# Patient Record
Sex: MALE | Race: WHITE | NOT HISPANIC OR LATINO | URBAN - METROPOLITAN AREA
[De-identification: names, ages, dates, MRNs, and addresses within clinical notes are randomized per-mention and may not be internally consistent; named-entity substitution may affect disease eponyms.]

---

## 2023-10-06 PROBLEM — N13.8 BENIGN PROSTATIC HYPERPLASIA WITH URINARY OBSTRUCTION: Status: ACTIVE | Noted: 2018-05-09

## 2023-10-06 PROBLEM — R97.20 RAISED PROSTATE SPECIFIC ANTIGEN: Status: ACTIVE | Noted: 2018-07-05

## 2023-10-06 PROBLEM — N62 GYNECOMASTIA: Status: ACTIVE | Noted: 2020-12-11

## 2023-10-06 PROBLEM — C61 MALIGNANT TUMOR OF PROSTATE (MULTI): Status: ACTIVE | Noted: 2018-07-26

## 2023-10-06 PROBLEM — N40.1 BENIGN PROSTATIC HYPERPLASIA WITH URINARY OBSTRUCTION: Status: ACTIVE | Noted: 2018-05-09

## 2023-10-06 PROBLEM — R35.0 INCREASED FREQUENCY OF URINATION: Status: ACTIVE | Noted: 2022-03-09

## 2023-10-06 PROBLEM — R31.0 FRANK HEMATURIA: Status: ACTIVE | Noted: 2018-07-05

## 2023-10-06 RX ORDER — ZOLEDRONIC ACID 4 MG/5ML
INJECTION INTRAVENOUS
COMMUNITY
Start: 2021-03-19

## 2023-10-06 RX ORDER — NITROGLYCERIN 0.4 MG/1
0.4 TABLET SUBLINGUAL EVERY 5 MIN PRN
COMMUNITY

## 2023-10-06 RX ORDER — SOTALOL HYDROCHLORIDE 120 MG/1
120 TABLET ORAL EVERY 12 HOURS
COMMUNITY
Start: 2013-02-21

## 2023-10-06 RX ORDER — TAMSULOSIN HYDROCHLORIDE 0.4 MG/1
0.4 CAPSULE ORAL 2 TIMES DAILY
COMMUNITY

## 2023-10-06 RX ORDER — FUROSEMIDE 20 MG/1
20 TABLET ORAL DAILY PRN
COMMUNITY
Start: 2015-01-08 | End: 2023-10-24 | Stop reason: SDUPTHER

## 2023-10-06 RX ORDER — SIMVASTATIN 10 MG/1
10 TABLET, FILM COATED ORAL DAILY
COMMUNITY
Start: 2011-01-04 | End: 2024-04-02

## 2023-10-06 RX ORDER — DORZOLAMIDE HYDROCHLORIDE AND TIMOLOL MALEATE 20; 5 MG/ML; MG/ML
SOLUTION/ DROPS OPHTHALMIC
COMMUNITY
Start: 2017-06-13

## 2023-10-06 RX ORDER — POTASSIUM CHLORIDE 750 MG/1
10 CAPSULE, EXTENDED RELEASE ORAL DAILY
COMMUNITY
Start: 2020-02-27

## 2023-10-06 RX ORDER — BIMATOPROST 0.1 MG/ML
1 SOLUTION/ DROPS OPHTHALMIC NIGHTLY
COMMUNITY

## 2023-10-06 RX ORDER — MELATONIN 5 MG
1 CAPSULE ORAL NIGHTLY
COMMUNITY

## 2023-10-06 RX ORDER — ZOLEDRONIC ACID 5 MG/100ML
5 INJECTION, SOLUTION INTRAVENOUS ONCE
COMMUNITY

## 2023-10-06 RX ORDER — NAPROXEN SODIUM 220 MG/1
81 TABLET, FILM COATED ORAL DAILY
COMMUNITY
Start: 2012-11-01

## 2023-10-06 RX ORDER — BRIMONIDINE TARTRATE 1 MG/ML
1 SOLUTION/ DROPS OPHTHALMIC
COMMUNITY

## 2023-10-06 RX ORDER — WARFARIN 3 MG/1
3 TABLET ORAL DAILY
COMMUNITY
Start: 2022-03-17 | End: 2023-12-21 | Stop reason: SDUPTHER

## 2023-10-06 RX ORDER — RISEDRONATE SODIUM 150 MG/1
150 TABLET, FILM COATED ORAL
COMMUNITY

## 2023-10-06 RX ORDER — ACETAMINOPHEN 500 MG
TABLET ORAL
COMMUNITY
Start: 2022-05-26

## 2023-10-12 ENCOUNTER — OFFICE VISIT (OUTPATIENT)
Dept: PRIMARY CARE | Facility: CLINIC | Age: 83
End: 2023-10-12
Payer: MEDICARE

## 2023-10-12 DIAGNOSIS — I48.11 LONGSTANDING PERSISTENT ATRIAL FIBRILLATION (MULTI): Primary | ICD-10-CM

## 2023-10-12 LAB — POC INR: 2.4 (ref 0.9–1.1)

## 2023-10-12 PROCEDURE — 1036F TOBACCO NON-USER: CPT | Performed by: NURSE PRACTITIONER

## 2023-10-12 PROCEDURE — 85610 PROTHROMBIN TIME: CPT | Performed by: NURSE PRACTITIONER

## 2023-10-12 PROCEDURE — 99213 OFFICE O/P EST LOW 20 MIN: CPT | Performed by: NURSE PRACTITIONER

## 2023-10-12 ASSESSMENT — ENCOUNTER SYMPTOMS
CONSTITUTIONAL NEGATIVE: 1
CARDIOVASCULAR NEGATIVE: 1
RESPIRATORY NEGATIVE: 1

## 2023-10-12 NOTE — PROGRESS NOTES
Subjective   Patient ID: Gerry Celeste is a 82 y.o. male.    HPI  Here for inr check   Feels well but just lost a son from a devasting PE  He is sleeping and eating no other issues  No cp, sob, pressure, pain or swelling and no bleeding  Warfarin 3mg all days  Last inr 2.4 todays inr 2.4 no changes follow up 3 weeks       Review of Systems   Constitutional: Negative.    Respiratory: Negative.     Cardiovascular: Negative.    Genitourinary: Negative.    Skin: Negative.    All other systems reviewed and are negative.      Objective   Physical Exam  Constitutional:       Appearance: Normal appearance. He is normal weight.   Eyes:      Pupils: Pupils are equal, round, and reactive to light.   Cardiovascular:      Rate and Rhythm: Normal rate and regular rhythm.      Heart sounds: Normal heart sounds.   Pulmonary:      Effort: Pulmonary effort is normal.      Breath sounds: Normal breath sounds.   Skin:     General: Skin is warm and dry.   Neurological:      General: No focal deficit present.      Mental Status: He is alert and oriented to person, place, and time. Mental status is at baseline.   Psychiatric:         Behavior: Behavior normal.         Thought Content: Thought content normal.         Judgment: Judgment normal.         Assessment/Plan   Diagnoses and all orders for this visit:  Longstanding persistent atrial fibrillation (CMS/HCC)  -     POCT INR manually resulted  Other orders  -     Follow Up In Primary Care - Established; Future

## 2023-10-24 ENCOUNTER — OFFICE VISIT (OUTPATIENT)
Dept: PRIMARY CARE | Facility: CLINIC | Age: 83
End: 2023-10-24
Payer: MEDICARE

## 2023-10-24 VITALS
HEIGHT: 72 IN | OXYGEN SATURATION: 98 % | WEIGHT: 151 LBS | SYSTOLIC BLOOD PRESSURE: 142 MMHG | BODY MASS INDEX: 20.45 KG/M2 | TEMPERATURE: 97.9 F | RESPIRATION RATE: 16 BRPM | DIASTOLIC BLOOD PRESSURE: 80 MMHG | HEART RATE: 72 BPM

## 2023-10-24 DIAGNOSIS — R60.0 LOCALIZED EDEMA: ICD-10-CM

## 2023-10-24 DIAGNOSIS — I48.0 PAROXYSMAL ATRIAL FIBRILLATION (MULTI): Primary | ICD-10-CM

## 2023-10-24 DIAGNOSIS — Z79.01 LONG TERM (CURRENT) USE OF ANTICOAGULANTS: ICD-10-CM

## 2023-10-24 PROCEDURE — 1159F MED LIST DOCD IN RCRD: CPT | Performed by: NURSE PRACTITIONER

## 2023-10-24 PROCEDURE — 3079F DIAST BP 80-89 MM HG: CPT | Performed by: NURSE PRACTITIONER

## 2023-10-24 PROCEDURE — 3077F SYST BP >= 140 MM HG: CPT | Performed by: NURSE PRACTITIONER

## 2023-10-24 PROCEDURE — 1036F TOBACCO NON-USER: CPT | Performed by: NURSE PRACTITIONER

## 2023-10-24 PROCEDURE — 99213 OFFICE O/P EST LOW 20 MIN: CPT | Performed by: NURSE PRACTITIONER

## 2023-10-24 PROCEDURE — 85610 PROTHROMBIN TIME: CPT | Performed by: NURSE PRACTITIONER

## 2023-10-24 RX ORDER — FUROSEMIDE 20 MG/1
20 TABLET ORAL DAILY PRN
Qty: 30 TABLET | Refills: 1 | Status: SHIPPED | OUTPATIENT
Start: 2023-10-24

## 2023-10-24 ASSESSMENT — ENCOUNTER SYMPTOMS
PSYCHIATRIC NEGATIVE: 1
CARDIOVASCULAR NEGATIVE: 1
RESPIRATORY NEGATIVE: 1
CONSTITUTIONAL NEGATIVE: 1
GASTROINTESTINAL NEGATIVE: 1
NEUROLOGICAL NEGATIVE: 1

## 2023-10-24 NOTE — PROGRESS NOTES
Subjective   Patient ID: Gerry Celeste is a 82 y.o. male.    HPI  Here for inr check  Last 2.4 on warfarin 3mg all days  No cp, sob, pressure, pain or swelling and no bleeding  Inr today  Will take 1/2 dose warfarin today   His last bro is now ill     Review of Systems   Constitutional: Negative.    Respiratory: Negative.     Cardiovascular: Negative.    Gastrointestinal: Negative.    Skin: Negative.    Neurological: Negative.    Psychiatric/Behavioral: Negative.       Objective   Physical Exam  Vitals and nursing note reviewed.   Constitutional:       Appearance: Normal appearance.   HENT:      Head: Normocephalic.   Eyes:      Pupils: Pupils are equal, round, and reactive to light.   Cardiovascular:      Rate and Rhythm: Normal rate and regular rhythm.      Heart sounds: Normal heart sounds.   Pulmonary:      Effort: Pulmonary effort is normal.      Breath sounds: Normal breath sounds.   Skin:     General: Skin is warm and dry.   Neurological:      General: No focal deficit present.      Mental Status: He is alert and oriented to person, place, and time. Mental status is at baseline.   Psychiatric:         Mood and Affect: Mood normal.         Behavior: Behavior normal.         Thought Content: Thought content normal.         Judgment: Judgment normal.     Assessment/Plan   Diagnoses and all orders for this visit:  Paroxysmal atrial fibrillation (CMS/HCC)  -     POCT INR manually resulted

## 2023-10-24 NOTE — LETTER
Handicap placard for 5 years. Effective date 10/24/23 to 10/24/28. Pt cannot walk 200 ft without stopping to rest.

## 2023-10-27 LAB — POC INR: 2.7 (ref 0.9–1.1)

## 2023-11-02 ENCOUNTER — OFFICE VISIT (OUTPATIENT)
Dept: PRIMARY CARE | Facility: CLINIC | Age: 83
End: 2023-11-02
Payer: MEDICARE

## 2023-11-02 VITALS
BODY MASS INDEX: 20.18 KG/M2 | HEIGHT: 72 IN | SYSTOLIC BLOOD PRESSURE: 122 MMHG | WEIGHT: 149 LBS | DIASTOLIC BLOOD PRESSURE: 80 MMHG

## 2023-11-02 DIAGNOSIS — Z79.01 LONG TERM (CURRENT) USE OF ANTICOAGULANTS: Primary | ICD-10-CM

## 2023-11-02 DIAGNOSIS — I48.11 LONGSTANDING PERSISTENT ATRIAL FIBRILLATION (MULTI): ICD-10-CM

## 2023-11-02 LAB — POC INR: 2.9 (ref 0.9–1.1)

## 2023-11-02 PROCEDURE — 3074F SYST BP LT 130 MM HG: CPT | Performed by: NURSE PRACTITIONER

## 2023-11-02 PROCEDURE — 99213 OFFICE O/P EST LOW 20 MIN: CPT | Performed by: NURSE PRACTITIONER

## 2023-11-02 PROCEDURE — 3079F DIAST BP 80-89 MM HG: CPT | Performed by: NURSE PRACTITIONER

## 2023-11-02 PROCEDURE — 1036F TOBACCO NON-USER: CPT | Performed by: NURSE PRACTITIONER

## 2023-11-02 PROCEDURE — 1159F MED LIST DOCD IN RCRD: CPT | Performed by: NURSE PRACTITIONER

## 2023-11-02 PROCEDURE — 85610 PROTHROMBIN TIME: CPT | Performed by: NURSE PRACTITIONER

## 2023-11-02 ASSESSMENT — ENCOUNTER SYMPTOMS
CARDIOVASCULAR NEGATIVE: 1
RESPIRATORY NEGATIVE: 1
GASTROINTESTINAL NEGATIVE: 1
CONSTITUTIONAL NEGATIVE: 1

## 2023-11-02 NOTE — PROGRESS NOTES
Subjective   Patient ID: Gerry Celeste is a 82 y.o. male.    HPI  Here for inr check'inr tdoay is 2.9  Warfarin 3mg all days   Will go to 1/2 dose on all thurs back to rest days 3mg  'more greens in diet       Review of Systems   Constitutional: Negative.    Respiratory: Negative.     Cardiovascular: Negative.    Gastrointestinal: Negative.    Skin: Negative.      Visit Vitals  /80 (Patient Position: Sitting, BP Cuff Size: Adult)      Weight 149 lb    Lab Results   Component Value Date    INR 2.9 (A) 11/02/2023    INR 2.7 (A) 10/27/2023    INR 2.4 (A) 10/12/2023      Objective   Physical Exam  Vitals and nursing note reviewed.   Constitutional:       Appearance: Normal appearance.   HENT:      Head: Normocephalic.   Eyes:      Pupils: Pupils are equal, round, and reactive to light.   Cardiovascular:      Rate and Rhythm: Normal rate and regular rhythm.      Heart sounds: Normal heart sounds.   Pulmonary:      Effort: Pulmonary effort is normal.      Breath sounds: Normal breath sounds.   Skin:     General: Skin is warm and dry.   Neurological:      General: No focal deficit present.      Mental Status: He is alert and oriented to person, place, and time. Mental status is at baseline.   Psychiatric:         Mood and Affect: Mood normal.         Behavior: Behavior normal.         Thought Content: Thought content normal.         Judgment: Judgment normal.       Problem List Items Addressed This Visit    None  Visit Diagnoses         Codes    Long term (current) use of anticoagulants    -  Primary Z79.01    Relevant Orders    POCT INR manually resulted (Completed)    Longstanding persistent atrial fibrillation (CMS/HCC)     I48.11

## 2023-11-16 ENCOUNTER — OFFICE VISIT (OUTPATIENT)
Dept: PRIMARY CARE | Facility: CLINIC | Age: 83
End: 2023-11-16
Payer: MEDICARE

## 2023-11-16 VITALS
RESPIRATION RATE: 12 BRPM | SYSTOLIC BLOOD PRESSURE: 116 MMHG | DIASTOLIC BLOOD PRESSURE: 82 MMHG | BODY MASS INDEX: 20.63 KG/M2 | HEART RATE: 68 BPM | WEIGHT: 150 LBS

## 2023-11-16 DIAGNOSIS — Z79.01 LONG TERM (CURRENT) USE OF ANTICOAGULANTS: Primary | ICD-10-CM

## 2023-11-16 DIAGNOSIS — M79.662 PAIN OF LEFT CALF: ICD-10-CM

## 2023-11-16 DIAGNOSIS — I48.0 PAROXYSMAL ATRIAL FIBRILLATION (MULTI): ICD-10-CM

## 2023-11-16 LAB — POC INR: 2.1 (ref 0.9–1.1)

## 2023-11-16 PROCEDURE — 85610 PROTHROMBIN TIME: CPT | Performed by: NURSE PRACTITIONER

## 2023-11-16 PROCEDURE — 1159F MED LIST DOCD IN RCRD: CPT | Performed by: NURSE PRACTITIONER

## 2023-11-16 PROCEDURE — 3074F SYST BP LT 130 MM HG: CPT | Performed by: NURSE PRACTITIONER

## 2023-11-16 PROCEDURE — 3079F DIAST BP 80-89 MM HG: CPT | Performed by: NURSE PRACTITIONER

## 2023-11-16 PROCEDURE — 1036F TOBACCO NON-USER: CPT | Performed by: NURSE PRACTITIONER

## 2023-11-16 PROCEDURE — 99213 OFFICE O/P EST LOW 20 MIN: CPT | Performed by: NURSE PRACTITIONER

## 2023-11-16 ASSESSMENT — ENCOUNTER SYMPTOMS
MUSCULOSKELETAL NEGATIVE: 1
GASTROINTESTINAL NEGATIVE: 1
CONSTITUTIONAL NEGATIVE: 1
RESPIRATORY NEGATIVE: 1
CARDIOVASCULAR NEGATIVE: 1

## 2023-11-16 NOTE — PROGRESS NOTES
Subjective   Patient ID: Gerry Celeste is a 83 y.o. male.    HPI  Here for inr check   Last inr was 2.9  Warfarin 3mg continues excetp 1/2 tab on thur  He was in ER for calf pain and mostly reswolved  No ultrasound was run due to very unlikely   He has a dvt on therapeutic warfarin  Has had no cp, sob, pressure, pain or swelling   Inr today is 2.1  Ate more greens this week will back down   Sees dr parker second week of dec   Will go back to all 3mg     Next inr 2 weeks    Review of Systems   Constitutional: Negative.    Respiratory: Negative.     Cardiovascular: Negative.    Gastrointestinal: Negative.    Musculoskeletal: Negative.         Calf pain - resolving   Skin: Negative.        Objective   Physical Exam  Vitals and nursing note reviewed.   Constitutional:       General: He is not in acute distress.     Appearance: Normal appearance.   HENT:      Head: Normocephalic and atraumatic.   Eyes:      Extraocular Movements: Extraocular movements intact.      Pupils: Pupils are equal, round, and reactive to light.   Cardiovascular:      Rate and Rhythm: Normal rate and regular rhythm.   Pulmonary:      Effort: Pulmonary effort is normal.      Breath sounds: Normal breath sounds.   Abdominal:      Palpations: Abdomen is soft.   Skin:     General: Skin is warm and dry.   Neurological:      General: No focal deficit present.      Mental Status: He is alert and oriented to person, place, and time.   Psychiatric:         Mood and Affect: Mood normal.         Behavior: Behavior normal.       Problem List Items Addressed This Visit    None  Visit Diagnoses         Codes    Paroxysmal atrial fibrillation (CMS/HCC)    -  Primary I48.0    Long term (current) use of anticoagulants     Z79.01    Pain of left calf     M79.662

## 2023-12-07 ENCOUNTER — OFFICE VISIT (OUTPATIENT)
Dept: PRIMARY CARE | Facility: CLINIC | Age: 83
End: 2023-12-07
Payer: MEDICARE

## 2023-12-07 VITALS
BODY MASS INDEX: 20.74 KG/M2 | OXYGEN SATURATION: 98 % | WEIGHT: 150.8 LBS | DIASTOLIC BLOOD PRESSURE: 70 MMHG | SYSTOLIC BLOOD PRESSURE: 128 MMHG | TEMPERATURE: 97.8 F | HEART RATE: 68 BPM

## 2023-12-07 DIAGNOSIS — Z79.01 LONG TERM (CURRENT) USE OF ANTICOAGULANTS: ICD-10-CM

## 2023-12-07 DIAGNOSIS — I48.0 PAROXYSMAL ATRIAL FIBRILLATION (MULTI): Primary | ICD-10-CM

## 2023-12-07 LAB
POC INR: 3.7
POC PROTHROMBIN TIME: NORMAL

## 2023-12-07 PROCEDURE — 85610 PROTHROMBIN TIME: CPT | Performed by: NURSE PRACTITIONER

## 2023-12-07 PROCEDURE — 1036F TOBACCO NON-USER: CPT | Performed by: NURSE PRACTITIONER

## 2023-12-07 PROCEDURE — 3078F DIAST BP <80 MM HG: CPT | Performed by: NURSE PRACTITIONER

## 2023-12-07 PROCEDURE — 3074F SYST BP LT 130 MM HG: CPT | Performed by: NURSE PRACTITIONER

## 2023-12-07 PROCEDURE — 1160F RVW MEDS BY RX/DR IN RCRD: CPT | Performed by: NURSE PRACTITIONER

## 2023-12-07 PROCEDURE — 99213 OFFICE O/P EST LOW 20 MIN: CPT | Performed by: NURSE PRACTITIONER

## 2023-12-07 PROCEDURE — 1159F MED LIST DOCD IN RCRD: CPT | Performed by: NURSE PRACTITIONER

## 2023-12-07 ASSESSMENT — ENCOUNTER SYMPTOMS
HEMATOLOGIC/LYMPHATIC NEGATIVE: 1
PSYCHIATRIC NEGATIVE: 1
CONSTITUTIONAL NEGATIVE: 1
ENDOCRINE NEGATIVE: 1
NEUROLOGICAL NEGATIVE: 1
RESPIRATORY NEGATIVE: 1
CARDIOVASCULAR NEGATIVE: 1
GASTROINTESTINAL NEGATIVE: 1

## 2023-12-07 NOTE — PROGRESS NOTES
Gerry Celeste male  83 y.o. for long term use of anticoagulation visit.    Last inr visit 2.1 (2.9)  Lots going on, travel, lost a son and a brother but ok, says he is sleeping ok  Warfarin 3mg all days now  No cp, sob, pressure, pain or swelling  No bleeding  Inr today is 3.7    Now hold today then 3mg all days except 1/2 tablets all Thursdays        Visit Vitals  /70   Pulse 68   Temp 36.6 °C (97.8 °F)      Vitals:    12/07/23 1010   Weight: 68.4 kg (150 lb 12.8 oz)      Lab Results   Component Value Date    INR 3.70 12/07/2023    INR 2.1 (A) 11/16/2023    INR 2.9 (A) 11/02/2023    INR 2.7 (A) 10/27/2023    INR 2.4 (A) 10/12/2023        Lab Results   Component Value Date    INR 2.1 (A) 11/16/2023    INR 2.9 (A) 11/02/2023    INR 2.7 (A) 10/27/2023    INR 2.4 (A) 10/12/2023        Review of Systems   Constitutional: Negative.    Respiratory: Negative.     Cardiovascular: Negative.    Gastrointestinal: Negative.    Endocrine: Negative.    Skin: Negative.    Neurological: Negative.    Hematological: Negative.    Psychiatric/Behavioral: Negative.        HPI   Here for inr check.   Feels well  No cp, sob, pressure, pain or swelling and no bleeding    Physical Exam  Vitals and nursing note reviewed.   Constitutional:       Appearance: Normal appearance.   HENT:      Head: Normocephalic.   Eyes:      Pupils: Pupils are equal, round, and reactive to light.   Cardiovascular:      Rate and Rhythm: Normal rate and regular rhythm.      Heart sounds: Normal heart sounds.   Pulmonary:      Effort: Pulmonary effort is normal.      Breath sounds: Normal breath sounds.   Skin:     General: Skin is warm and dry.   Neurological:      General: No focal deficit present.      Mental Status: He is alert and oriented to person, place, and time. Mental status is at baseline.   Psychiatric:         Mood and Affect: Mood normal.         Behavior: Behavior normal.         Thought Content: Thought content normal.         Judgment:  Judgment normal.        There were no vitals taken for this visit. There were no vitals filed for this visit.     Problem List Items Addressed This Visit    None  Visit Diagnoses         Codes    Paroxysmal atrial fibrillation (CMS/[facility])    -  Primary I48.0    Long term (current) use of anticoagulants     Z79.01

## 2023-12-11 ENCOUNTER — LAB (OUTPATIENT)
Dept: LAB | Facility: LAB | Age: 83
End: 2023-12-11
Payer: MEDICARE

## 2023-12-11 ENCOUNTER — OFFICE VISIT (OUTPATIENT)
Dept: PRIMARY CARE | Facility: CLINIC | Age: 83
End: 2023-12-11
Payer: MEDICARE

## 2023-12-11 VITALS
HEART RATE: 56 BPM | TEMPERATURE: 97.4 F | SYSTOLIC BLOOD PRESSURE: 118 MMHG | DIASTOLIC BLOOD PRESSURE: 80 MMHG | BODY MASS INDEX: 20.49 KG/M2 | RESPIRATION RATE: 16 BRPM | WEIGHT: 149 LBS

## 2023-12-11 DIAGNOSIS — I48.91 ATRIAL FIBRILLATION, UNSPECIFIED TYPE (MULTI): ICD-10-CM

## 2023-12-11 DIAGNOSIS — I10 HYPERTENSION, UNSPECIFIED TYPE: ICD-10-CM

## 2023-12-11 DIAGNOSIS — E78.2 MODERATE MIXED HYPERLIPIDEMIA NOT REQUIRING STATIN THERAPY: ICD-10-CM

## 2023-12-11 DIAGNOSIS — E78.2 MODERATE MIXED HYPERLIPIDEMIA NOT REQUIRING STATIN THERAPY: Primary | ICD-10-CM

## 2023-12-11 LAB
ALBUMIN SERPL BCP-MCNC: 4.4 G/DL (ref 3.4–5)
ALP SERPL-CCNC: 39 U/L (ref 33–136)
ALT SERPL W P-5'-P-CCNC: 20 U/L (ref 10–52)
ANION GAP SERPL CALC-SCNC: 11 MMOL/L (ref 10–20)
AST SERPL W P-5'-P-CCNC: 27 U/L (ref 9–39)
BILIRUB SERPL-MCNC: 0.8 MG/DL (ref 0–1.2)
BUN SERPL-MCNC: 26 MG/DL (ref 6–23)
CALCIUM SERPL-MCNC: 9.5 MG/DL (ref 8.6–10.3)
CHLORIDE SERPL-SCNC: 107 MMOL/L (ref 98–107)
CHOLEST SERPL-MCNC: 161 MG/DL (ref 0–199)
CHOLESTEROL/HDL RATIO: 3.4
CO2 SERPL-SCNC: 31 MMOL/L (ref 21–32)
CREAT SERPL-MCNC: 1.1 MG/DL (ref 0.5–1.3)
ERYTHROCYTE [DISTWIDTH] IN BLOOD BY AUTOMATED COUNT: 12.5 % (ref 11.5–14.5)
GFR SERPL CREATININE-BSD FRML MDRD: 66 ML/MIN/1.73M*2
GLUCOSE SERPL-MCNC: 95 MG/DL (ref 74–99)
HCT VFR BLD AUTO: 41.5 % (ref 41–52)
HDLC SERPL-MCNC: 47.8 MG/DL
HGB BLD-MCNC: 13.5 G/DL (ref 13.5–17.5)
LDLC SERPL CALC-MCNC: 93 MG/DL
MCH RBC QN AUTO: 31 PG (ref 26–34)
MCHC RBC AUTO-ENTMCNC: 32.5 G/DL (ref 32–36)
MCV RBC AUTO: 95 FL (ref 80–100)
NON HDL CHOLESTEROL: 113 MG/DL (ref 0–149)
NRBC BLD-RTO: 0 /100 WBCS (ref 0–0)
PLATELET # BLD AUTO: 200 X10*3/UL (ref 150–450)
POC INR: 3
POC PROTHROMBIN TIME: NORMAL
POTASSIUM SERPL-SCNC: 4.3 MMOL/L (ref 3.5–5.3)
PROT SERPL-MCNC: 7.3 G/DL (ref 6.4–8.2)
RBC # BLD AUTO: 4.36 X10*6/UL (ref 4.5–5.9)
SODIUM SERPL-SCNC: 145 MMOL/L (ref 136–145)
TRIGL SERPL-MCNC: 102 MG/DL (ref 0–149)
TSH SERPL-ACNC: 2.74 MIU/L (ref 0.44–3.98)
VLDL: 20 MG/DL (ref 0–40)
WBC # BLD AUTO: 7.1 X10*3/UL (ref 4.4–11.3)

## 2023-12-11 PROCEDURE — 1159F MED LIST DOCD IN RCRD: CPT | Performed by: FAMILY MEDICINE

## 2023-12-11 PROCEDURE — 1160F RVW MEDS BY RX/DR IN RCRD: CPT | Performed by: FAMILY MEDICINE

## 2023-12-11 PROCEDURE — 99214 OFFICE O/P EST MOD 30 MIN: CPT | Performed by: FAMILY MEDICINE

## 2023-12-11 PROCEDURE — 80061 LIPID PANEL: CPT

## 2023-12-11 PROCEDURE — 80053 COMPREHEN METABOLIC PANEL: CPT

## 2023-12-11 PROCEDURE — 36415 COLL VENOUS BLD VENIPUNCTURE: CPT

## 2023-12-11 PROCEDURE — 84443 ASSAY THYROID STIM HORMONE: CPT

## 2023-12-11 PROCEDURE — 85027 COMPLETE CBC AUTOMATED: CPT

## 2023-12-11 PROCEDURE — 3074F SYST BP LT 130 MM HG: CPT | Performed by: FAMILY MEDICINE

## 2023-12-11 PROCEDURE — 3079F DIAST BP 80-89 MM HG: CPT | Performed by: FAMILY MEDICINE

## 2023-12-11 PROCEDURE — 85610 PROTHROMBIN TIME: CPT | Performed by: FAMILY MEDICINE

## 2023-12-11 PROCEDURE — 1036F TOBACCO NON-USER: CPT | Performed by: FAMILY MEDICINE

## 2023-12-11 NOTE — PROGRESS NOTES
Subjective   Patient ID: Gerry Celeste is a 83 y.o. male who presents for Follow-up (6mon fu ).  HPI  Patient with hx of Afib   HTN   HLD   Low vitamin D   Prostate cancer   Raynauds;s   WILMER   lives in [facility]   his son Brian in [facility]   retired   not currently in distress     INR 3 today , he skipped Thursday dose and took 3 mg on Saturday and Sunday   The week before he was taking 3 mg daily       3mg all days except 1/2 tablets all Thursdays     Review of Systems    Past Medical History:   Diagnosis Date    Essential hypertension, benign     Paroxysmal atrial fibrillation (CMS/HCC)        Past Surgical History:   Procedure Laterality Date    CARPAL TUNNEL RELEASE Bilateral     HERNIA REPAIR      REVERSE TOTAL SHOULDER ARTHROPLASTY Left       Social History     Socioeconomic History    Marital status: Unknown     Spouse name: None    Number of children: None    Years of education: None    Highest education level: None   Occupational History    None   Tobacco Use    Smoking status: Never    Smokeless tobacco: Never   Substance and Sexual Activity    Alcohol use: Not Currently    Drug use: Never    Sexual activity: None   Other Topics Concern    None   Social History Narrative    None     Social Determinants of Health     Financial Resource Strain: Not on file   Food Insecurity: Not on file   Transportation Needs: Not on file   Physical Activity: Not on file   Stress: Not on file   Social Connections: Not on file   Intimate Partner Violence: Not on file   Housing Stability: Not on file      Family History   Problem Relation Name Age of Onset    Stroke Father         MEDICATIONS AND ALLERGIES:    ALLERGIES Patient has no known allergies.    MEDICATIONS   Current Outpatient Medications on File Prior to Visit   Medication Sig Dispense Refill    Alphagan P 0.1 % ophthalmic solution Administer 1 drop into both eyes. one hour after omnipred bid -      aspirin 81 mg chewable tablet Chew 1 tablet (81 mg) once  daily.      cholecalciferol (Vitamin D-3) 5,000 Units tablet Take by mouth.  normally 10,000. he cut down to 5000 as of 5/20/22      dorzolamide-timoloL (Cosopt) 22.3-6.8 mg/mL ophthalmic solution       furosemide (Lasix) 20 mg tablet Take 1 tablet (20 mg) by mouth once daily as needed (instruct for swelling). per dr sumner 30 tablet 1    Lumigan 0.01 % ophthalmic solution Administer 1 drop into both eyes once daily at bedtime.      nitroglycerin (Nitrostat) 0.4 mg SL tablet Place 1 tablet (0.4 mg) under the tongue every 5 minutes if needed.  If no relief after 1 dose, call 911.      potassium chloride ER (Micro-K) 10 mEq ER capsule Take 1 capsule (10 mEq) by mouth once daily. when on the furosemide/lasix      simvastatin (Zocor) 10 mg tablet Take 1 tablet (10 mg) by mouth once daily.      sotalol (Betapace) 120 mg tablet Take 1 tablet (120 mg) by mouth every 12 hours. 9am and 9pm      tamsulosin (Flomax) 0.4 mg 24 hr capsule Take 1 capsule (0.4 mg) by mouth 2 times a day.      warfarin (Coumadin) 3 mg tablet Take 1 tablet (3 mg) by mouth once daily.      melatonin 5 mg capsule Take 1 capsule by mouth once daily at bedtime.      risedronate (Actonel) 150 mg tablet Take 1 tablet (150 mg) by mouth every 30 (thirty) days.      zoledronic acid (Reclast) 5 mg/100 mL piggyback Infuse 100 mL (5 mg) into a venous catheter 1 time.      zoledronic acid (Zometa) 4 mg/5 mL injection Infuse into a venous catheter.  for osteoporosis - given 3/29/21 yearly injection - [facility]       No current facility-administered medications on file prior to visit.        Objective   Visit Vitals  /80   Pulse 56   Temp 36.3 °C (97.4 °F)   Resp 16   Wt 67.6 kg (149 lb)   BMI 20.49 kg/m²   Smoking Status Never   BSA 1.85 m²      Physical Exam  Constitutional:       Appearance: Normal appearance.   HENT:      Head: Normocephalic and atraumatic.   Eyes:      Pupils: Pupils are equal, round, and reactive to light.    Cardiovascular:      Rate and Rhythm: Normal rate.   Pulmonary:      Effort: Pulmonary effort is normal.   Musculoskeletal:         General: No swelling.      Cervical back: Normal range of motion.   Skin:     Coloration: Skin is not jaundiced.   Neurological:      General: No focal deficit present.      Mental Status: He is alert and oriented to person, place, and time.       1. Moderate mixed hyperlipidemia not requiring statin therapy  Will check labs below   - CBC; Future  - Lipid Panel; Future  - Comprehensive Metabolic Panel; Future  - TSH with reflex to Free T4 if abnormal; Future    2. Hypertension, unspecified type  Stable   Will check labs below   - CBC; Future  - Lipid Panel; Future  - Comprehensive Metabolic Panel; Future  - TSH with reflex to Free T4 if abnormal; Future    3. Atrial fibrillation, unspecified type (CMS/HCC)  INR today 3   He skipped Friday and took 3mg on Saturday and froday   Will cut eht dose to 1.5 mg on Monday and Friday and 3mg rest of the week   Scheduled with Mary in 10 days  - CBC; Future  - Lipid Panel; Future  - Comprehensive Metabolic Panel; Future  - TSH with reflex to Free T4 if abnormal; Future

## 2023-12-15 ENCOUNTER — TELEPHONE (OUTPATIENT)
Dept: PRIMARY CARE | Facility: CLINIC | Age: 83
End: 2023-12-15
Payer: MEDICARE

## 2023-12-15 NOTE — TELEPHONE ENCOUNTER
----- Message from Jeromy Covington MD sent at 12/14/2023  2:54 PM EST -----  Labs showing stable sugar, kidney and electrolytes ,   Blood counts stable   Thyroid normal   Cholesterol good

## 2023-12-21 ENCOUNTER — OFFICE VISIT (OUTPATIENT)
Dept: PRIMARY CARE | Facility: CLINIC | Age: 83
End: 2023-12-21
Payer: MEDICARE

## 2023-12-21 DIAGNOSIS — Z79.01 LONG TERM (CURRENT) USE OF ANTICOAGULANTS: ICD-10-CM

## 2023-12-21 DIAGNOSIS — I48.11 LONGSTANDING PERSISTENT ATRIAL FIBRILLATION (MULTI): Primary | ICD-10-CM

## 2023-12-21 LAB
POC INR: 2.9
POC PROTHROMBIN TIME: NORMAL

## 2023-12-21 PROCEDURE — 1036F TOBACCO NON-USER: CPT | Performed by: NURSE PRACTITIONER

## 2023-12-21 PROCEDURE — 85610 PROTHROMBIN TIME: CPT | Performed by: NURSE PRACTITIONER

## 2023-12-21 PROCEDURE — 99213 OFFICE O/P EST LOW 20 MIN: CPT | Performed by: NURSE PRACTITIONER

## 2023-12-21 PROCEDURE — 1160F RVW MEDS BY RX/DR IN RCRD: CPT | Performed by: NURSE PRACTITIONER

## 2023-12-21 PROCEDURE — 1159F MED LIST DOCD IN RCRD: CPT | Performed by: NURSE PRACTITIONER

## 2023-12-21 RX ORDER — WARFARIN 3 MG/1
3 TABLET ORAL EVERY EVENING
Qty: 90 TABLET | Refills: 3 | Status: SHIPPED
Start: 2023-12-21 | End: 2024-04-24 | Stop reason: DRUGHIGH

## 2023-12-21 ASSESSMENT — ENCOUNTER SYMPTOMS
CARDIOVASCULAR NEGATIVE: 1
RESPIRATORY NEGATIVE: 1
PSYCHIATRIC NEGATIVE: 1
NEUROLOGICAL NEGATIVE: 1
CONSTITUTIONAL NEGATIVE: 1
GASTROINTESTINAL NEGATIVE: 1

## 2023-12-21 NOTE — PROGRESS NOTES
Subjective   Patient ID: Gerry Celeste is a 83 y.o. male.    HPI  Here for inr check  Last inr 3.0 (3.7)  Wafarin continues at 3mg daily  He has had no cp, sob, pressure, pain or swelling and no bleeding  Inr 2.9  Last 3  Plans on eating more greens over the holidays  Continue 3mg all days continue     Wt down today 147 'jeff been busy' will montior  112/68 hr 82 r 10     Review of Systems   Constitutional: Negative.    Respiratory: Negative.     Cardiovascular: Negative.    Gastrointestinal: Negative.    Skin: Negative.    Neurological: Negative.    Psychiatric/Behavioral: Negative.         Objective   All neg  There were no vitals taken for this visit.     Problem List Items Addressed This Visit    None  Visit Diagnoses         Codes    Longstanding persistent atrial fibrillation (CMS/HCC)    -  Primary I48.11    Relevant Medications    warfarin (Coumadin) 3 mg tablet    Other Relevant Orders    POCT INR manually resulted (Completed)    Long term (current) use of anticoagulants     Z79.01    Relevant Medications    warfarin (Coumadin) 3 mg tablet    Other Relevant Orders    POCT INR manually resulted (Completed)

## 2024-01-04 ENCOUNTER — OFFICE VISIT (OUTPATIENT)
Dept: PRIMARY CARE | Facility: CLINIC | Age: 84
End: 2024-01-04
Payer: MEDICARE

## 2024-01-04 VITALS
DIASTOLIC BLOOD PRESSURE: 60 MMHG | HEART RATE: 84 BPM | SYSTOLIC BLOOD PRESSURE: 110 MMHG | BODY MASS INDEX: 20.32 KG/M2 | WEIGHT: 150 LBS | RESPIRATION RATE: 12 BRPM | HEIGHT: 72 IN

## 2024-01-04 DIAGNOSIS — Z79.01 LONG TERM (CURRENT) USE OF ANTICOAGULANTS: ICD-10-CM

## 2024-01-04 DIAGNOSIS — I48.0 PAROXYSMAL A-FIB (MULTI): Primary | ICD-10-CM

## 2024-01-04 LAB
POC INR: 2.5
POC PROTHROMBIN TIME: NORMAL

## 2024-01-04 PROCEDURE — 1159F MED LIST DOCD IN RCRD: CPT | Performed by: NURSE PRACTITIONER

## 2024-01-04 PROCEDURE — 3078F DIAST BP <80 MM HG: CPT | Performed by: NURSE PRACTITIONER

## 2024-01-04 PROCEDURE — 85610 PROTHROMBIN TIME: CPT | Performed by: NURSE PRACTITIONER

## 2024-01-04 PROCEDURE — 3074F SYST BP LT 130 MM HG: CPT | Performed by: NURSE PRACTITIONER

## 2024-01-04 PROCEDURE — 99213 OFFICE O/P EST LOW 20 MIN: CPT | Performed by: NURSE PRACTITIONER

## 2024-01-04 PROCEDURE — 1036F TOBACCO NON-USER: CPT | Performed by: NURSE PRACTITIONER

## 2024-01-04 ASSESSMENT — ENCOUNTER SYMPTOMS
GASTROINTESTINAL NEGATIVE: 1
MUSCULOSKELETAL NEGATIVE: 1
NEUROLOGICAL NEGATIVE: 1
ENDOCRINE NEGATIVE: 1
PSYCHIATRIC NEGATIVE: 1
RESPIRATORY NEGATIVE: 1
CONSTITUTIONAL NEGATIVE: 1

## 2024-01-04 NOTE — PROGRESS NOTES
"Gerry Celeste male  83 y.o. for long term use of anticoagulation visit.    Review of Systems   Constitutional: Negative.    HENT: Negative.     Respiratory: Negative.     Gastrointestinal: Negative.    Endocrine: Negative.    Genitourinary: Negative.    Musculoskeletal: Negative.    Skin: Negative.    Neurological: Negative.    Psychiatric/Behavioral: Negative.          HPI   Here for inr check - today 2.5  Last 2.9  Wafarin 3mg all days   He did have more greens in his diet   Follow up 3 wks      Physical Exam  Vitals and nursing note reviewed.   Constitutional:       Appearance: Normal appearance.   HENT:      Head: Normocephalic.   Eyes:      Pupils: Pupils are equal, round, and reactive to light.   Cardiovascular:      Rate and Rhythm: Normal rate and regular rhythm.      Heart sounds: Normal heart sounds.   Pulmonary:      Effort: Pulmonary effort is normal.      Breath sounds: Normal breath sounds.   Skin:     General: Skin is warm and dry.   Neurological:      General: No focal deficit present.      Mental Status: He is alert and oriented to person, place, and time. Mental status is at baseline.   Psychiatric:         Mood and Affect: Mood normal.         Behavior: Behavior normal.         Thought Content: Thought content normal.         Judgment: Judgment normal.      /60   Pulse 84   Resp 12   Ht 1.816 m (5' 11.5\")   Wt 68 kg (150 lb)   BMI 20.63 kg/m²      Past Medical History:   Diagnosis Date    Essential hypertension, benign     Paroxysmal atrial fibrillation (CMS/HCC)         Past Surgical History:   Procedure Laterality Date    CARPAL TUNNEL RELEASE Bilateral     HERNIA REPAIR      REVERSE TOTAL SHOULDER ARTHROPLASTY Left         Anticoagulation Episode Summary       Current INR goal:     TTR:  --   Next INR check:     INR from last check:  2.50 (1/4/2024)   Weekly max warfarin dose:     Target end date:     INR check location:     Preferred lab:     Send INR reminders to:         " Comments:                Problem List Items Addressed This Visit    None  Visit Diagnoses         Codes    Paroxysmal A-fib (CMS/HCC)    -  Primary I48.0    Long term (current) use of anticoagulants     Z79.01

## 2024-01-24 ASSESSMENT — ENCOUNTER SYMPTOMS
RESPIRATORY NEGATIVE: 1
ENDOCRINE NEGATIVE: 1
NEUROLOGICAL NEGATIVE: 1
PSYCHIATRIC NEGATIVE: 1
MUSCULOSKELETAL NEGATIVE: 1
CONSTITUTIONAL NEGATIVE: 1
GASTROINTESTINAL NEGATIVE: 1
CARDIOVASCULAR NEGATIVE: 1

## 2024-01-24 NOTE — PROGRESS NOTES
Subjective   Patient ID: Gerry Celeste is a 83 y.o. male.    HPI  Here for inr check/anticoag mgmt  Last inr was 2.50 - inr 3.0 - has not been eating a lot of greens and will  Eat more grrrmd    Prior 2.9  Warfarin continues at 3mg all days  He will see dr parker next week re: mayur  and will check then inr     He has had no cp, sob, pressure, pain or swelling and no bleeding  Utd with dr taisha larsen and utd with dr roque for his hx of prostate ca  Also has seen dr parkre pcp doing well there    Review of Systems   Constitutional: Negative.    HENT: Negative.     Respiratory: Negative.     Cardiovascular: Negative.    Gastrointestinal: Negative.    Endocrine: Negative.    Genitourinary: Negative.    Musculoskeletal: Negative.    Skin: Negative.    Neurological: Negative.    Psychiatric/Behavioral: Negative.         Objective   Physical Exam  Vitals and nursing note reviewed.   Constitutional:       Appearance: Normal appearance.   HENT:      Head: Normocephalic.   Eyes:      Pupils: Pupils are equal, round, and reactive to light.   Cardiovascular:      Rate and Rhythm: Normal rate and regular rhythm.      Heart sounds: Normal heart sounds.   Pulmonary:      Effort: Pulmonary effort is normal.      Breath sounds: Normal breath sounds.   Skin:     General: Skin is warm and dry.   Neurological:      General: No focal deficit present.      Mental Status: He is alert and oriented to person, place, and time. Mental status is at baseline.   Psychiatric:         Mood and Affect: Mood normal.         Behavior: Behavior normal.         Thought Content: Thought content normal.         Judgment: Judgment normal.         Assessment/Plan   Diagnoses and all orders for this visit:  Paroxysmal A-fib (CMS/HCC)  -     POCT INR manually resulted  Hypertension, unspecified type  Long term (current) use of anticoagulants  -     POCT INR manually resulted         numerical 0-10

## 2024-01-25 ENCOUNTER — OFFICE VISIT (OUTPATIENT)
Dept: PRIMARY CARE | Facility: CLINIC | Age: 84
End: 2024-01-25
Payer: MEDICARE

## 2024-01-25 VITALS
WEIGHT: 151 LBS | DIASTOLIC BLOOD PRESSURE: 60 MMHG | BODY MASS INDEX: 20.45 KG/M2 | SYSTOLIC BLOOD PRESSURE: 108 MMHG | HEIGHT: 72 IN

## 2024-01-25 DIAGNOSIS — I48.0 PAROXYSMAL A-FIB (MULTI): Primary | ICD-10-CM

## 2024-01-25 DIAGNOSIS — I10 HYPERTENSION, UNSPECIFIED TYPE: ICD-10-CM

## 2024-01-25 DIAGNOSIS — Z79.01 LONG TERM (CURRENT) USE OF ANTICOAGULANTS: ICD-10-CM

## 2024-01-25 LAB — POC INR: 3 (ref 0.9–1.1)

## 2024-01-25 PROCEDURE — 3078F DIAST BP <80 MM HG: CPT | Performed by: NURSE PRACTITIONER

## 2024-01-25 PROCEDURE — 1160F RVW MEDS BY RX/DR IN RCRD: CPT | Performed by: NURSE PRACTITIONER

## 2024-01-25 PROCEDURE — 85610 PROTHROMBIN TIME: CPT | Performed by: NURSE PRACTITIONER

## 2024-01-25 PROCEDURE — 99213 OFFICE O/P EST LOW 20 MIN: CPT | Performed by: NURSE PRACTITIONER

## 2024-01-25 PROCEDURE — 3074F SYST BP LT 130 MM HG: CPT | Performed by: NURSE PRACTITIONER

## 2024-01-25 PROCEDURE — 1159F MED LIST DOCD IN RCRD: CPT | Performed by: NURSE PRACTITIONER

## 2024-01-25 PROCEDURE — 1036F TOBACCO NON-USER: CPT | Performed by: NURSE PRACTITIONER

## 2024-01-30 ENCOUNTER — OFFICE VISIT (OUTPATIENT)
Dept: PRIMARY CARE | Facility: CLINIC | Age: 84
End: 2024-01-30
Payer: MEDICARE

## 2024-01-30 VITALS
SYSTOLIC BLOOD PRESSURE: 121 MMHG | OXYGEN SATURATION: 98 % | DIASTOLIC BLOOD PRESSURE: 67 MMHG | TEMPERATURE: 97.7 F | RESPIRATION RATE: 16 BRPM | BODY MASS INDEX: 21.18 KG/M2 | HEART RATE: 61 BPM | WEIGHT: 154 LBS

## 2024-01-30 DIAGNOSIS — G47.33 OSA (OBSTRUCTIVE SLEEP APNEA): Primary | ICD-10-CM

## 2024-01-30 DIAGNOSIS — I48.91 ATRIAL FIBRILLATION, UNSPECIFIED TYPE (MULTI): ICD-10-CM

## 2024-01-30 LAB
POC INR: 2.1
POC PROTHROMBIN TIME: NORMAL

## 2024-01-30 PROCEDURE — 1159F MED LIST DOCD IN RCRD: CPT | Performed by: FAMILY MEDICINE

## 2024-01-30 PROCEDURE — 99213 OFFICE O/P EST LOW 20 MIN: CPT | Performed by: FAMILY MEDICINE

## 2024-01-30 PROCEDURE — 1160F RVW MEDS BY RX/DR IN RCRD: CPT | Performed by: FAMILY MEDICINE

## 2024-01-30 PROCEDURE — 1036F TOBACCO NON-USER: CPT | Performed by: FAMILY MEDICINE

## 2024-01-30 PROCEDURE — 85610 PROTHROMBIN TIME: CPT | Performed by: FAMILY MEDICINE

## 2024-01-30 PROCEDURE — 3074F SYST BP LT 130 MM HG: CPT | Performed by: FAMILY MEDICINE

## 2024-01-30 PROCEDURE — 3078F DIAST BP <80 MM HG: CPT | Performed by: FAMILY MEDICINE

## 2024-01-30 NOTE — PROGRESS NOTES
Subjective   Patient ID: Gerry Celeste is a 83 y.o. male who presents for Follow-up (Discuss cpap -needs documentation that he is using and benefiting from the CPAP machine ).  HPI  Patient with hx of WILMER , he is dependent on CPAP machine , has been using the machine around 7 hours a night , which is 100% of his sleeping time.   And he has been using it every night   Using the CPAP machine helps with the fatigue .   And improve his blood pressure , heart rate, and fatigue.   Review of Systems    Past Medical History:   Diagnosis Date    Essential hypertension, benign     Paroxysmal atrial fibrillation (CMS/HCC)        Past Surgical History:   Procedure Laterality Date    CARPAL TUNNEL RELEASE Bilateral     HERNIA REPAIR      REVERSE TOTAL SHOULDER ARTHROPLASTY Left       Social History     Socioeconomic History    Marital status: Unknown     Spouse name: Not on file    Number of children: Not on file    Years of education: Not on file    Highest education level: Not on file   Occupational History    Not on file   Tobacco Use    Smoking status: Never    Smokeless tobacco: Never   Substance and Sexual Activity    Alcohol use: Not Currently    Drug use: Never    Sexual activity: Not on file   Other Topics Concern    Not on file   Social History Narrative    Not on file     Social Determinants of Health     Financial Resource Strain: Not on file   Food Insecurity: Not on file   Transportation Needs: Not on file   Physical Activity: Not on file   Stress: Not on file   Social Connections: Not on file   Intimate Partner Violence: Not on file   Housing Stability: Not on file      Family History   Problem Relation Name Age of Onset    Stroke Father         MEDICATIONS AND ALLERGIES:    ALLERGIES Patient has no known allergies.    MEDICATIONS   Current Outpatient Medications on File Prior to Visit   Medication Sig Dispense Refill    Alphagan P 0.1 % ophthalmic solution Administer 1 drop into both eyes. one hour after  omnipred bid -      aspirin 81 mg chewable tablet Chew 1 tablet (81 mg) once daily.      cholecalciferol (Vitamin D-3) 5,000 Units tablet Take by mouth.  normally 10,000. he cut down to 5000 as of 5/20/22      dorzolamide-timoloL (Cosopt) 22.3-6.8 mg/mL ophthalmic solution       furosemide (Lasix) 20 mg tablet Take 1 tablet (20 mg) by mouth once daily as needed (instruct for swelling). per dr sumner 30 tablet 1    Lumigan 0.01 % ophthalmic solution Administer 1 drop into both eyes once daily at bedtime.      melatonin 5 mg capsule Take 1 capsule by mouth once daily at bedtime.      nitroglycerin (Nitrostat) 0.4 mg SL tablet Place 1 tablet (0.4 mg) under the tongue every 5 minutes if needed.  If no relief after 1 dose, call 911.      potassium chloride ER (Micro-K) 10 mEq ER capsule Take 1 capsule (10 mEq) by mouth once daily. when on the furosemide/lasix      risedronate (Actonel) 150 mg tablet Take 1 tablet (150 mg) by mouth every 30 (thirty) days.      simvastatin (Zocor) 10 mg tablet Take 1 tablet (10 mg) by mouth once daily.      sotalol (Betapace) 120 mg tablet Take 1 tablet (120 mg) by mouth every 12 hours. 9am and 9pm      tamsulosin (Flomax) 0.4 mg 24 hr capsule Take 1 capsule (0.4 mg) by mouth 2 times a day.      warfarin (Coumadin) 3 mg tablet Take 1 tablet (3 mg) by mouth once daily in the evening. 90 tablet 3    zoledronic acid (Reclast) 5 mg/100 mL piggyback Infuse 100 mL (5 mg) into a venous catheter 1 time.      zoledronic acid (Zometa) 4 mg/5 mL injection Infuse into a venous catheter.  for osteoporosis - given 3/29/21 yearly injection - [facility]       No current facility-administered medications on file prior to visit.        Objective   Visit Vitals  /67   Pulse 61   Temp 36.5 °C (97.7 °F)   Resp 16   Wt 69.9 kg (154 lb)   SpO2 98%   BMI 21.18 kg/m²   Smoking Status Never   BSA 1.88 m²      Physical Exam    Constitutional: awake; alert, interactive; in no acute distress; well  nourished and well developed  ENT: ears and nose were normal in appearance;  Eyes: pupils equal and round  Pulmonary: no respiratory distress and normal respiratory rhythm and effort  Skin: normal skin color and pigmentation;  Psychiatric: oriented to person, place, and time; affect was normal and the mood was normal       1. WILMER (obstructive sleep apnea)  For the last 3 months He has been using the machine around 7 hours a night , which is 100% of his sleeping time.   And he has been using it every night   Using the CPAP machine helps with the fatigue .   And improve his blood pressure , heart rate, and fatigue.     2. Atrial fibrillation, unspecified type (CMS/HCC)  INR 2.1   Continue current dose   Recheck in 3 weeks as previously scheduled.

## 2024-02-28 ASSESSMENT — ENCOUNTER SYMPTOMS
CARDIOVASCULAR NEGATIVE: 1
GASTROINTESTINAL NEGATIVE: 1
NEUROLOGICAL NEGATIVE: 1
PSYCHIATRIC NEGATIVE: 1
CONSTITUTIONAL NEGATIVE: 1
MUSCULOSKELETAL NEGATIVE: 1
ENDOCRINE NEGATIVE: 1
RESPIRATORY NEGATIVE: 1

## 2024-02-29 ENCOUNTER — OFFICE VISIT (OUTPATIENT)
Dept: PRIMARY CARE | Facility: CLINIC | Age: 84
End: 2024-02-29
Payer: MEDICARE

## 2024-02-29 DIAGNOSIS — Z79.01 LONG TERM (CURRENT) USE OF ANTICOAGULANTS: ICD-10-CM

## 2024-02-29 DIAGNOSIS — I48.91 ATRIAL FIBRILLATION, UNSPECIFIED TYPE (MULTI): ICD-10-CM

## 2024-02-29 DIAGNOSIS — I10 HYPERTENSION, UNSPECIFIED TYPE: ICD-10-CM

## 2024-02-29 DIAGNOSIS — I48.0 PAROXYSMAL A-FIB (MULTI): Primary | ICD-10-CM

## 2024-02-29 NOTE — PROGRESS NOTES
Gerry KHANH Roula male  83 y.o. for long term use of anticoagulation visit.     HPI   Inr check  Feels well  No bleeding  Active  No cp, sob, pressure, pain or sweling  Last inr =  2.1 (2.5)  Inr today -   Warfarin 3mg all days      Review of Systems   Constitutional: Negative.    HENT: Negative.     Respiratory: Negative.     Cardiovascular: Negative.    Gastrointestinal: Negative.    Endocrine: Negative.    Genitourinary: Negative.    Musculoskeletal: Negative.    Skin: Negative.    Neurological: Negative.    Psychiatric/Behavioral: Negative.         Physical Exam  Vitals and nursing note reviewed.   Constitutional:       Appearance: Normal appearance.   HENT:      Head: Normocephalic.   Eyes:      Pupils: Pupils are equal, round, and reactive to light.   Cardiovascular:      Rate and Rhythm: Normal rate and regular rhythm.      Heart sounds: Normal heart sounds.   Pulmonary:      Effort: Pulmonary effort is normal.      Breath sounds: Normal breath sounds.   Skin:     General: Skin is warm and dry.   Neurological:      General: No focal deficit present.      Mental Status: He is alert and oriented to person, place, and time. Mental status is at baseline.   Psychiatric:         Mood and Affect: Mood normal.         Behavior: Behavior normal.         Thought Content: Thought content normal.         Judgment: Judgment normal.          There were no vitals taken for this visit. There were no vitals filed for this visit.     Anticoagulation Episode Summary       Current INR goal:     TTR:  --   Next INR check:     INR from last check:  2.10 (1/30/2024)   Weekly max warfarin dose:     Target end date:     INR check location:     Preferred lab:     Send INR reminders to:         Comments:                   Problem List Items Addressed This Visit    None  Visit Diagnoses         Codes    Paroxysmal A-fib (CMS/[facility], located [facility])    -  Primary I48.0    Hypertension, unspecified type     I10    Long term (current) use of anticoagulants      Z79.01         Laura L Seaver, APRN-CNP

## 2024-03-12 ENCOUNTER — TELEPHONE (OUTPATIENT)
Dept: PRIMARY CARE | Facility: CLINIC | Age: 84
End: 2024-03-12
Payer: MEDICARE

## 2024-03-12 NOTE — TELEPHONE ENCOUNTER
----- Message from Laura L Seaver, APRN-CNP sent at 3/5/2024  5:31 PM EST -----  Inr is 2.8  I believe he already has a follow up appt for inr but if not - please schedule for 2-3 weeks. Tad garcia  ----- Message -----  From: Mary Moeller  Sent: 3/4/2024   1:38 PM EST  To: Laura L Seaver, APRN-CNP

## 2024-03-14 ENCOUNTER — OFFICE VISIT (OUTPATIENT)
Dept: PRIMARY CARE | Facility: CLINIC | Age: 84
End: 2024-03-14
Payer: MEDICARE

## 2024-03-14 VITALS
BODY MASS INDEX: 19.77 KG/M2 | DIASTOLIC BLOOD PRESSURE: 68 MMHG | HEART RATE: 72 BPM | HEIGHT: 72 IN | RESPIRATION RATE: 10 BRPM | WEIGHT: 146 LBS | SYSTOLIC BLOOD PRESSURE: 112 MMHG

## 2024-03-14 DIAGNOSIS — I10 HYPERTENSION, UNSPECIFIED TYPE: ICD-10-CM

## 2024-03-14 DIAGNOSIS — I48.0 PAROXYSMAL A-FIB (MULTI): Primary | ICD-10-CM

## 2024-03-14 DIAGNOSIS — Z79.01 LONG TERM (CURRENT) USE OF ANTICOAGULANTS: ICD-10-CM

## 2024-03-14 LAB
POC INR: 6.6
POC PROTHROMBIN TIME: NORMAL

## 2024-03-14 PROCEDURE — 99213 OFFICE O/P EST LOW 20 MIN: CPT | Performed by: NURSE PRACTITIONER

## 2024-03-14 PROCEDURE — 85610 PROTHROMBIN TIME: CPT | Performed by: NURSE PRACTITIONER

## 2024-03-14 PROCEDURE — 1160F RVW MEDS BY RX/DR IN RCRD: CPT | Performed by: NURSE PRACTITIONER

## 2024-03-14 PROCEDURE — 3074F SYST BP LT 130 MM HG: CPT | Performed by: NURSE PRACTITIONER

## 2024-03-14 PROCEDURE — 1036F TOBACCO NON-USER: CPT | Performed by: NURSE PRACTITIONER

## 2024-03-14 PROCEDURE — 1159F MED LIST DOCD IN RCRD: CPT | Performed by: NURSE PRACTITIONER

## 2024-03-14 PROCEDURE — 3078F DIAST BP <80 MM HG: CPT | Performed by: NURSE PRACTITIONER

## 2024-03-14 ASSESSMENT — ENCOUNTER SYMPTOMS
ENDOCRINE NEGATIVE: 1
RESPIRATORY NEGATIVE: 1
PSYCHIATRIC NEGATIVE: 1
CONSTITUTIONAL NEGATIVE: 1
GASTROINTESTINAL NEGATIVE: 1
CARDIOVASCULAR NEGATIVE: 1
MUSCULOSKELETAL NEGATIVE: 1
NEUROLOGICAL NEGATIVE: 1

## 2024-03-14 NOTE — PROGRESS NOTES
Gerry Celeste male  83 y.o. for long term use of anticoagulation visit.     HPI   Inr today is 6.6 recheck 4.4  He will hold today  Warfarin 3mg all days   No new meds  1.5mg on thurs  and rest days 3mg   No bleeding  No cp, sob, pressure no bleeding       Review of Systems   Constitutional: Negative.    HENT: Negative.     Respiratory: Negative.     Cardiovascular: Negative.    Gastrointestinal: Negative.    Endocrine: Negative.    Genitourinary: Negative.    Musculoskeletal: Negative.    Skin: Negative.    Neurological: Negative.    Psychiatric/Behavioral: Negative.       Visit Vitals  /68   Pulse 72   Resp 10      Physical Exam  Vitals and nursing note reviewed.   Constitutional:       Appearance: Normal appearance.   HENT:      Head: Normocephalic.   Eyes:      Pupils: Pupils are equal, round, and reactive to light.   Cardiovascular:      Rate and Rhythm: Normal rate and regular rhythm.      Heart sounds: Normal heart sounds.   Pulmonary:      Effort: Pulmonary effort is normal.      Breath sounds: Normal breath sounds.   Skin:     General: Skin is warm and dry.   Neurological:      General: No focal deficit present.      Mental Status: He is alert and oriented to person, place, and time. Mental status is at baseline.   Psychiatric:         Mood and Affect: Mood normal.         Behavior: Behavior normal.         Thought Content: Thought content normal.         Judgment: Judgment normal.        Problem List Items Addressed This Visit    None  Visit Diagnoses         Codes    Paroxysmal A-fib (CMS/HCC)    -  Primary I48.0    Hypertension, unspecified type     I10    Long term (current) use of anticoagulants     Z79.01           There were no vitals taken for this visit. There were no vitals filed for this visit.     Anticoagulation Episode Summary       Current INR goal:     TTR:  --   Next INR check:     INR from last check:  2.10 (1/30/2024)   Weekly max warfarin dose:     Target end date:     INR check  location:     Preferred lab:     Send INR reminders to:         Comments:                Problem List Items Addressed This Visit    None  Visit Diagnoses         Codes    Paroxysmal A-fib (CMS/HCC)    -  Primary I48.0    Relevant Orders    POCT INR manually resulted (Completed)    Hypertension, unspecified type     I10    Relevant Orders    POCT INR manually resulted (Completed)    Long term (current) use of anticoagulants     Z79.01    Relevant Orders    POCT INR manually resulted (Completed)

## 2024-03-20 ASSESSMENT — ENCOUNTER SYMPTOMS
PSYCHIATRIC NEGATIVE: 1
CONSTITUTIONAL NEGATIVE: 1
NEUROLOGICAL NEGATIVE: 1
RESPIRATORY NEGATIVE: 1
ENDOCRINE NEGATIVE: 1
MUSCULOSKELETAL NEGATIVE: 1
GASTROINTESTINAL NEGATIVE: 1
CARDIOVASCULAR NEGATIVE: 1

## 2024-03-21 ENCOUNTER — OFFICE VISIT (OUTPATIENT)
Dept: PRIMARY CARE | Facility: CLINIC | Age: 84
End: 2024-03-21
Payer: MEDICARE

## 2024-03-21 VITALS
BODY MASS INDEX: 19.72 KG/M2 | DIASTOLIC BLOOD PRESSURE: 68 MMHG | HEIGHT: 72 IN | WEIGHT: 145.6 LBS | HEART RATE: 70 BPM | SYSTOLIC BLOOD PRESSURE: 120 MMHG

## 2024-03-21 DIAGNOSIS — Z79.01 LONG TERM (CURRENT) USE OF ANTICOAGULANTS: ICD-10-CM

## 2024-03-21 DIAGNOSIS — I48.0 PAROXYSMAL A-FIB (MULTI): Primary | ICD-10-CM

## 2024-03-21 DIAGNOSIS — I10 HYPERTENSION, UNSPECIFIED TYPE: ICD-10-CM

## 2024-03-21 LAB
POC INR: 3.2
POC PROTHROMBIN TIME: NORMAL

## 2024-03-21 PROCEDURE — 1160F RVW MEDS BY RX/DR IN RCRD: CPT | Performed by: NURSE PRACTITIONER

## 2024-03-21 PROCEDURE — 1159F MED LIST DOCD IN RCRD: CPT | Performed by: NURSE PRACTITIONER

## 2024-03-21 PROCEDURE — 1036F TOBACCO NON-USER: CPT | Performed by: NURSE PRACTITIONER

## 2024-03-21 PROCEDURE — 3078F DIAST BP <80 MM HG: CPT | Performed by: NURSE PRACTITIONER

## 2024-03-21 PROCEDURE — 3074F SYST BP LT 130 MM HG: CPT | Performed by: NURSE PRACTITIONER

## 2024-03-21 PROCEDURE — 99213 OFFICE O/P EST LOW 20 MIN: CPT | Performed by: NURSE PRACTITIONER

## 2024-03-21 PROCEDURE — 85610 PROTHROMBIN TIME: CPT | Performed by: NURSE PRACTITIONER

## 2024-03-21 NOTE — PROGRESS NOTES
Gerry Celeste male  83 y.o. for long term use of anticoagulation visit.     HPI   Inr check one week  Inr today 3.2  Hold today then back to 3mg all days and follow up one week  He will also be on antibiotics for a tooth repair he says    Very high last ov 6.6  He says the eating has been off  Last inr 2.1, 2.5, 2.9  Wafarin held now 3mg all days but 1.5mg on thurs  Has had no bleeding  No med changes he reports  No cp, sob, pressure, pain or swelling    Review of Systems   Constitutional: Negative.    HENT: Negative.     Respiratory: Negative.     Cardiovascular: Negative.    Gastrointestinal: Negative.    Endocrine: Negative.    Genitourinary: Negative.    Musculoskeletal: Negative.    Skin: Negative.    Neurological: Negative.    Psychiatric/Behavioral: Negative.         Physical Exam  Vitals and nursing note reviewed.   Constitutional:       Appearance: Normal appearance.   HENT:      Head: Normocephalic.   Eyes:      Pupils: Pupils are equal, round, and reactive to light.   Cardiovascular:      Rate and Rhythm: Normal rate and regular rhythm.      Heart sounds: Normal heart sounds.   Pulmonary:      Effort: Pulmonary effort is normal.      Breath sounds: Normal breath sounds.   Skin:     General: Skin is warm and dry.   Neurological:      General: No focal deficit present.      Mental Status: He is alert and oriented to person, place, and time. Mental status is at baseline.   Psychiatric:         Mood and Affect: Mood normal.         Behavior: Behavior normal.         Thought Content: Thought content normal.         Judgment: Judgment normal.       Visit Vitals  /68   Pulse 70      Problem List Items Addressed This Visit    None  Visit Diagnoses         Codes    Paroxysmal A-fib (CMS/formerly [facility])    -  Primary I48.0    Hypertension, unspecified type     I10    Long term (current) use of anticoagulants     Z79.01

## 2024-03-28 ENCOUNTER — OFFICE VISIT (OUTPATIENT)
Dept: PRIMARY CARE | Facility: CLINIC | Age: 84
End: 2024-03-28
Payer: MEDICARE

## 2024-03-28 VITALS
SYSTOLIC BLOOD PRESSURE: 118 MMHG | DIASTOLIC BLOOD PRESSURE: 64 MMHG | WEIGHT: 142 LBS | HEIGHT: 72 IN | BODY MASS INDEX: 19.23 KG/M2

## 2024-03-28 DIAGNOSIS — I48.0 PAROXYSMAL A-FIB (MULTI): Primary | ICD-10-CM

## 2024-03-28 DIAGNOSIS — I10 HYPERTENSION, UNSPECIFIED TYPE: ICD-10-CM

## 2024-03-28 DIAGNOSIS — Z79.01 LONG TERM (CURRENT) USE OF ANTICOAGULANTS: ICD-10-CM

## 2024-03-28 LAB
POC INR: 1.5
POC PROTHROMBIN TIME: NORMAL

## 2024-03-28 PROCEDURE — 85610 PROTHROMBIN TIME: CPT | Performed by: NURSE PRACTITIONER

## 2024-03-28 PROCEDURE — 3074F SYST BP LT 130 MM HG: CPT | Performed by: NURSE PRACTITIONER

## 2024-03-28 PROCEDURE — 3078F DIAST BP <80 MM HG: CPT | Performed by: NURSE PRACTITIONER

## 2024-03-28 PROCEDURE — 1160F RVW MEDS BY RX/DR IN RCRD: CPT | Performed by: NURSE PRACTITIONER

## 2024-03-28 PROCEDURE — 1159F MED LIST DOCD IN RCRD: CPT | Performed by: NURSE PRACTITIONER

## 2024-03-28 PROCEDURE — 99213 OFFICE O/P EST LOW 20 MIN: CPT | Performed by: NURSE PRACTITIONER

## 2024-03-28 ASSESSMENT — ENCOUNTER SYMPTOMS
MUSCULOSKELETAL NEGATIVE: 1
NEUROLOGICAL NEGATIVE: 1
CONSTITUTIONAL NEGATIVE: 1
GASTROINTESTINAL NEGATIVE: 1
PSYCHIATRIC NEGATIVE: 1
RESPIRATORY NEGATIVE: 1
CARDIOVASCULAR NEGATIVE: 1
ENDOCRINE NEGATIVE: 1

## 2024-03-28 NOTE — PROGRESS NOTES
Subjective   Patient ID: Gerry Celeste is a 83 y.o. male.    HPI  Sleep is off - lost a son and now in grief couseling   Lsat mayur testing has been years  Used to see neuro in past but they left the area   Dr parker just updated mayur testing and has a new machine now  Inr today - 1.5   Last inr 3.2   Warfarin 3mg daily except 1.5mg on thurs but now all 3mg for this week    No cp, sob, pressure, pain or bleeding     Review of Systems   Constitutional: Negative.    HENT: Negative.     Respiratory: Negative.     Cardiovascular: Negative.    Gastrointestinal: Negative.    Endocrine: Negative.    Genitourinary: Negative.    Musculoskeletal: Negative.    Skin: Negative.    Neurological: Negative.    Psychiatric/Behavioral: Negative.         Objective   Physical Exam  Vitals and nursing note reviewed.   Constitutional:       Appearance: Normal appearance.   HENT:      Head: Normocephalic.   Eyes:      Pupils: Pupils are equal, round, and reactive to light.   Cardiovascular:      Rate and Rhythm: Normal rate and regular rhythm.      Heart sounds: Normal heart sounds.   Pulmonary:      Effort: Pulmonary effort is normal.      Breath sounds: Normal breath sounds.   Skin:     General: Skin is warm and dry.   Neurological:      General: No focal deficit present.      Mental Status: He is alert and oriented to person, place, and time. Mental status is at baseline.   Psychiatric:         Mood and Affect: Mood normal.         Behavior: Behavior normal.         Thought Content: Thought content normal.         Judgment: Judgment normal.      Visit Vitals  /64    Hr 78  Problem List Items Addressed This Visit    None  Visit Diagnoses         Codes    Paroxysmal A-fib (CMS/HCC)    -  Primary I48.0    Relevant Orders    POCT INR manually resulted (Completed)    Hypertension, unspecified type     I10    Relevant Orders    POCT INR manually resulted (Completed)    Long term (current) use of anticoagulants     Z79.01    Relevant  Orders    POCT INR manually resulted (Completed)

## 2024-04-01 DIAGNOSIS — E78.2 MODERATE MIXED HYPERLIPIDEMIA NOT REQUIRING STATIN THERAPY: Primary | ICD-10-CM

## 2024-04-02 RX ORDER — SIMVASTATIN 10 MG/1
TABLET, FILM COATED ORAL DAILY
Qty: 90 TABLET | Refills: 3 | Status: SHIPPED | OUTPATIENT
Start: 2024-04-02

## 2024-04-03 ASSESSMENT — ENCOUNTER SYMPTOMS
CARDIOVASCULAR NEGATIVE: 1
ENDOCRINE NEGATIVE: 1
PSYCHIATRIC NEGATIVE: 1
MUSCULOSKELETAL NEGATIVE: 1
CONSTITUTIONAL NEGATIVE: 1
NEUROLOGICAL NEGATIVE: 1
RESPIRATORY NEGATIVE: 1
GASTROINTESTINAL NEGATIVE: 1

## 2024-04-03 NOTE — PROGRESS NOTES
Gerry Celeste male  83 y.o. for long term use of anticoagulation visit.     HPI   Inr check today - 3.6  Last inr 1.5 - Prior inr 3.2 (6.6, 2.1, 2.5, 2.9)  Warfarin 3mg all days but 1.5mg on thurs was in past but last week he took all days 3mg   No cp, sob, pressure, pain or swelling and no bleeding    Continues in grief counseling - lost a younger son doing ok there    Wt 143  He is loosing and he knows i am watching it  See psa doctor next month he reports - dr feliz  He says appetite is ok  He is grieving he says after his son passed   All labs normal with pcp 12/23 1/2 dose today (1.5mg) rest days 3mg   More greens in diet - 2 salads this week    Review of Systems   Constitutional: Negative.    HENT: Negative.     Respiratory: Negative.     Cardiovascular: Negative.    Gastrointestinal: Negative.    Endocrine: Negative.    Genitourinary: Negative.    Musculoskeletal: Negative.    Skin: Negative.    Neurological: Negative.    Psychiatric/Behavioral: Negative.         Physical Exam  Vitals and nursing note reviewed.   Constitutional:       Appearance: Normal appearance.   HENT:      Head: Normocephalic.   Eyes:      Pupils: Pupils are equal, round, and reactive to light.   Cardiovascular:      Rate and Rhythm: Normal rate and regular rhythm.      Heart sounds: Normal heart sounds.   Pulmonary:      Effort: Pulmonary effort is normal.      Breath sounds: Normal breath sounds.   Skin:     General: Skin is warm and dry.   Neurological:      General: No focal deficit present.      Mental Status: He is alert and oriented to person, place, and time. Mental status is at baseline.   Psychiatric:         Mood and Affect: Mood normal.         Behavior: Behavior normal.         Thought Content: Thought content normal.         Judgment: Judgment normal.        Problem List Items Addressed This Visit    None  Visit Diagnoses         Codes    Paroxysmal A-fib (CMS/HCC)    -  Primary I48.0    Relevant Orders    POCT INR  manually resulted    Hypertension, unspecified type     I10    Long term (current) use of anticoagulants     Z79.01    Relevant Orders    POCT INR manually resulted

## 2024-04-04 ENCOUNTER — OFFICE VISIT (OUTPATIENT)
Dept: PRIMARY CARE | Facility: CLINIC | Age: 84
End: 2024-04-04
Payer: MEDICARE

## 2024-04-04 VITALS
DIASTOLIC BLOOD PRESSURE: 72 MMHG | SYSTOLIC BLOOD PRESSURE: 120 MMHG | BODY MASS INDEX: 19.37 KG/M2 | HEIGHT: 72 IN | WEIGHT: 143 LBS | HEART RATE: 70 BPM

## 2024-04-04 DIAGNOSIS — I48.0 PAROXYSMAL A-FIB (MULTI): Primary | ICD-10-CM

## 2024-04-04 DIAGNOSIS — I10 HYPERTENSION, UNSPECIFIED TYPE: ICD-10-CM

## 2024-04-04 DIAGNOSIS — Z79.01 LONG TERM (CURRENT) USE OF ANTICOAGULANTS: ICD-10-CM

## 2024-04-04 LAB — POC INR: 3.6 (ref 0.9–1.1)

## 2024-04-04 PROCEDURE — 99213 OFFICE O/P EST LOW 20 MIN: CPT | Performed by: NURSE PRACTITIONER

## 2024-04-04 PROCEDURE — 1159F MED LIST DOCD IN RCRD: CPT | Performed by: NURSE PRACTITIONER

## 2024-04-04 PROCEDURE — 85610 PROTHROMBIN TIME: CPT | Performed by: NURSE PRACTITIONER

## 2024-04-04 PROCEDURE — 3074F SYST BP LT 130 MM HG: CPT | Performed by: NURSE PRACTITIONER

## 2024-04-04 PROCEDURE — 3078F DIAST BP <80 MM HG: CPT | Performed by: NURSE PRACTITIONER

## 2024-04-04 PROCEDURE — 1160F RVW MEDS BY RX/DR IN RCRD: CPT | Performed by: NURSE PRACTITIONER

## 2024-04-11 ENCOUNTER — OFFICE VISIT (OUTPATIENT)
Dept: PRIMARY CARE | Facility: CLINIC | Age: 84
End: 2024-04-11
Payer: MEDICARE

## 2024-04-11 VITALS
WEIGHT: 153 LBS | HEART RATE: 66 BPM | HEIGHT: 71 IN | BODY MASS INDEX: 21.42 KG/M2 | RESPIRATION RATE: 10 BRPM | DIASTOLIC BLOOD PRESSURE: 62 MMHG | SYSTOLIC BLOOD PRESSURE: 118 MMHG

## 2024-04-11 DIAGNOSIS — I48.0 PAROXYSMAL A-FIB (MULTI): Primary | ICD-10-CM

## 2024-04-11 DIAGNOSIS — I10 HYPERTENSION, UNSPECIFIED TYPE: ICD-10-CM

## 2024-04-11 DIAGNOSIS — Z79.01 LONG TERM (CURRENT) USE OF ANTICOAGULANTS: ICD-10-CM

## 2024-04-11 LAB
POC INR: 4.6
POC PROTHROMBIN TIME: NORMAL

## 2024-04-11 PROCEDURE — 1160F RVW MEDS BY RX/DR IN RCRD: CPT | Performed by: NURSE PRACTITIONER

## 2024-04-11 PROCEDURE — 1159F MED LIST DOCD IN RCRD: CPT | Performed by: NURSE PRACTITIONER

## 2024-04-11 PROCEDURE — 3078F DIAST BP <80 MM HG: CPT | Performed by: NURSE PRACTITIONER

## 2024-04-11 PROCEDURE — 85610 PROTHROMBIN TIME: CPT | Performed by: NURSE PRACTITIONER

## 2024-04-11 PROCEDURE — 3074F SYST BP LT 130 MM HG: CPT | Performed by: NURSE PRACTITIONER

## 2024-04-11 PROCEDURE — 99213 OFFICE O/P EST LOW 20 MIN: CPT | Performed by: NURSE PRACTITIONER

## 2024-04-11 ASSESSMENT — ENCOUNTER SYMPTOMS
ENDOCRINE NEGATIVE: 1
CARDIOVASCULAR NEGATIVE: 1
NEUROLOGICAL NEGATIVE: 1
PSYCHIATRIC NEGATIVE: 1
GASTROINTESTINAL NEGATIVE: 1
RESPIRATORY NEGATIVE: 1
MUSCULOSKELETAL NEGATIVE: 1
CONSTITUTIONAL NEGATIVE: 1

## 2024-04-11 NOTE — PROGRESS NOTES
Subjective   Patient ID: Gerry Celeste is a 83 y.o. male.    HPI  4.6  Last 3.2 and we cut that does down  3mg all days but 1/2 dose on thurs  He says he has been eating lots of greens    We will hold today then back to 3mg all days  - plan on 1/2 thurs but we will check inr beside we decide. He says he has stared some new foods   Follow up one week   Visit Vitals  /62   Pulse 66   Resp 10        Review of Systems   Constitutional: Negative.    HENT: Negative.     Respiratory: Negative.     Cardiovascular: Negative.    Gastrointestinal: Negative.    Endocrine: Negative.    Genitourinary: Negative.    Musculoskeletal: Negative.    Skin: Negative.    Neurological: Negative.    Psychiatric/Behavioral: Negative.         Objective   Physical Exam  Vitals and nursing note reviewed.   Constitutional:       Appearance: Normal appearance.   HENT:      Head: Normocephalic.   Eyes:      Pupils: Pupils are equal, round, and reactive to light.   Cardiovascular:      Rate and Rhythm: Normal rate and regular rhythm.      Heart sounds: Normal heart sounds.   Pulmonary:      Effort: Pulmonary effort is normal.      Breath sounds: Normal breath sounds.   Skin:     General: Skin is warm and dry.   Neurological:      General: No focal deficit present.      Mental Status: He is alert and oriented to person, place, and time. Mental status is at baseline.   Psychiatric:         Mood and Affect: Mood normal.         Behavior: Behavior normal.         Thought Content: Thought content normal.         Judgment: Judgment normal.     Problem List Items Addressed This Visit    None  Visit Diagnoses         Codes    Paroxysmal A-fib (CMS/[facility])    -  Primary I48.0    Hypertension, unspecified type     I10    Long term (current) use of anticoagulants     Z79.01

## 2024-04-17 NOTE — PROGRESS NOTES
Gerry Celeste male  83 y.o. for long term use of anticoagulation visit.     HPI   Inr last 4.6  Today 4.1  Prior 3.2 and those 2 last doses were cut down  Warfarin 3mg all days but 1/2 dose on thur only. And he did hold last thurs    Now lets change to hold today, 1/2 dose tomorrow (1.5mg) and rest days 3mg until seen in one week with dr parker. And one week follow up    SO:  WED - TODAY - NO DOSE  THURS - 1.5MG  FRI/SAT/SUN/MON/TUES 3MG  SEE DR PARKER ON WED    Will do some labs today to check creat, etc.     HE ALSO HAS BEEN LOSING WT. LOST HIS YOUNG SON. IN COUNSELING/GRIEF AT [facility], SOME HELP. WILL CHECK LABS.       Review of Systems   Constitutional: Negative.    HENT: Negative.     Respiratory: Negative.     Cardiovascular: Negative.    Gastrointestinal: Negative.    Endocrine: Negative.    Genitourinary: Negative.    Musculoskeletal: Negative.    Skin: Negative.    Neurological: Negative.    Psychiatric/Behavioral: Negative.         Physical Exam  Vitals and nursing note reviewed.   Constitutional:       Appearance: Normal appearance.   HENT:      Head: Normocephalic.   Eyes:      Pupils: Pupils are equal, round, and reactive to light.   Cardiovascular:      Rate and Rhythm: Normal rate and regular rhythm.      Heart sounds: Normal heart sounds.   Pulmonary:      Effort: Pulmonary effort is normal.      Breath sounds: Normal breath sounds.   Skin:     General: Skin is warm and dry.   Neurological:      General: No focal deficit present.      Mental Status: He is alert and oriented to person, place, and time. Mental status is at baseline.   Psychiatric:         Mood and Affect: Mood normal.         Behavior: Behavior normal.         Thought Content: Thought content normal.         Judgment: Judgment normal.             Visit Vitals  /64   Pulse 62   Resp 16      Problem List Items Addressed This Visit    None  Visit Diagnoses         Codes    Coagulation defect (Multi)    -  Primary D68.9    Relevant  Orders    POCT INR manually resulted    POCT INR manually resulted (Completed)    Hypertension, unspecified type     I10    Relevant Orders    Comprehensive Metabolic Panel (Completed)    Long term (current) use of anticoagulants     Z79.01    Atrial fibrillation, unspecified type (Multi)     I48.91    Weight loss     R63.4    Relevant Orders    TSH with reflex to Free T4 if abnormal (Completed)    Anemia, unspecified type     D64.9    Relevant Orders    CBC and Auto Differential (Completed)    Vitamin B12 (Completed)    Prostate cancer (Multi)     C61    Relevant Orders    PSA, total and free

## 2024-04-18 ENCOUNTER — LAB (OUTPATIENT)
Dept: LAB | Facility: LAB | Age: 84
End: 2024-04-18
Payer: MEDICARE

## 2024-04-18 ENCOUNTER — OFFICE VISIT (OUTPATIENT)
Dept: PRIMARY CARE | Facility: CLINIC | Age: 84
End: 2024-04-18
Payer: MEDICARE

## 2024-04-18 VITALS
DIASTOLIC BLOOD PRESSURE: 64 MMHG | WEIGHT: 144 LBS | SYSTOLIC BLOOD PRESSURE: 108 MMHG | HEIGHT: 71 IN | BODY MASS INDEX: 20.16 KG/M2 | HEART RATE: 62 BPM | RESPIRATION RATE: 16 BRPM

## 2024-04-18 DIAGNOSIS — C61 PROSTATE CANCER (MULTI): ICD-10-CM

## 2024-04-18 DIAGNOSIS — I48.0 PAROXYSMAL A-FIB (MULTI): ICD-10-CM

## 2024-04-18 DIAGNOSIS — R63.4 WEIGHT LOSS: ICD-10-CM

## 2024-04-18 DIAGNOSIS — I10 HYPERTENSION, UNSPECIFIED TYPE: ICD-10-CM

## 2024-04-18 DIAGNOSIS — Z79.01 LONG TERM (CURRENT) USE OF ANTICOAGULANTS: ICD-10-CM

## 2024-04-18 DIAGNOSIS — D64.9 ANEMIA, UNSPECIFIED TYPE: ICD-10-CM

## 2024-04-18 DIAGNOSIS — D68.9 COAGULATION DEFECT (MULTI): Primary | ICD-10-CM

## 2024-04-18 DIAGNOSIS — I48.91 ATRIAL FIBRILLATION, UNSPECIFIED TYPE (MULTI): ICD-10-CM

## 2024-04-18 LAB
ALBUMIN SERPL BCP-MCNC: 4.2 G/DL (ref 3.4–5)
ALP SERPL-CCNC: 39 U/L (ref 33–136)
ALT SERPL W P-5'-P-CCNC: 21 U/L (ref 10–52)
ANION GAP SERPL CALC-SCNC: 10 MMOL/L (ref 10–20)
AST SERPL W P-5'-P-CCNC: 27 U/L (ref 9–39)
BASOPHILS # BLD AUTO: 0.02 X10*3/UL (ref 0–0.1)
BASOPHILS NFR BLD AUTO: 0.3 %
BILIRUB SERPL-MCNC: 0.8 MG/DL (ref 0–1.2)
BUN SERPL-MCNC: 26 MG/DL (ref 6–23)
CALCIUM SERPL-MCNC: 9.1 MG/DL (ref 8.6–10.3)
CHLORIDE SERPL-SCNC: 107 MMOL/L (ref 98–107)
CO2 SERPL-SCNC: 28 MMOL/L (ref 21–32)
CREAT SERPL-MCNC: 1.09 MG/DL (ref 0.5–1.3)
EGFRCR SERPLBLD CKD-EPI 2021: 67 ML/MIN/1.73M*2
EOSINOPHIL # BLD AUTO: 0.12 X10*3/UL (ref 0–0.4)
EOSINOPHIL NFR BLD AUTO: 1.8 %
ERYTHROCYTE [DISTWIDTH] IN BLOOD BY AUTOMATED COUNT: 12.4 % (ref 11.5–14.5)
GLUCOSE SERPL-MCNC: 97 MG/DL (ref 74–99)
HCT VFR BLD AUTO: 39.2 % (ref 41–52)
HGB BLD-MCNC: 12.6 G/DL (ref 13.5–17.5)
IMM GRANULOCYTES # BLD AUTO: 0.01 X10*3/UL (ref 0–0.5)
IMM GRANULOCYTES NFR BLD AUTO: 0.2 % (ref 0–0.9)
INR PPP: 3.8 (ref 0.9–1.1)
LYMPHOCYTES # BLD AUTO: 1.27 X10*3/UL (ref 0.8–3)
LYMPHOCYTES NFR BLD AUTO: 19.6 %
MCH RBC QN AUTO: 30.6 PG (ref 26–34)
MCHC RBC AUTO-ENTMCNC: 32.1 G/DL (ref 32–36)
MCV RBC AUTO: 95 FL (ref 80–100)
MONOCYTES # BLD AUTO: 0.61 X10*3/UL (ref 0.05–0.8)
MONOCYTES NFR BLD AUTO: 9.4 %
NEUTROPHILS # BLD AUTO: 4.46 X10*3/UL (ref 1.6–5.5)
NEUTROPHILS NFR BLD AUTO: 68.7 %
NRBC BLD-RTO: 0 /100 WBCS (ref 0–0)
PLATELET # BLD AUTO: 173 X10*3/UL (ref 150–450)
POC INR: 4.1
POC PROTHROMBIN TIME: NORMAL
POTASSIUM SERPL-SCNC: 4.2 MMOL/L (ref 3.5–5.3)
PROT SERPL-MCNC: 6.8 G/DL (ref 6.4–8.2)
PROTHROMBIN TIME: 43.5 SECONDS (ref 9.8–12.8)
RBC # BLD AUTO: 4.12 X10*6/UL (ref 4.5–5.9)
SODIUM SERPL-SCNC: 141 MMOL/L (ref 136–145)
TSH SERPL-ACNC: 2.81 MIU/L (ref 0.44–3.98)
VIT B12 SERPL-MCNC: 346 PG/ML (ref 211–911)
WBC # BLD AUTO: 6.5 X10*3/UL (ref 4.4–11.3)

## 2024-04-18 PROCEDURE — 36415 COLL VENOUS BLD VENIPUNCTURE: CPT

## 2024-04-18 PROCEDURE — 3074F SYST BP LT 130 MM HG: CPT | Performed by: NURSE PRACTITIONER

## 2024-04-18 PROCEDURE — 84154 ASSAY OF PSA FREE: CPT

## 2024-04-18 PROCEDURE — 84443 ASSAY THYROID STIM HORMONE: CPT

## 2024-04-18 PROCEDURE — 1160F RVW MEDS BY RX/DR IN RCRD: CPT | Performed by: NURSE PRACTITIONER

## 2024-04-18 PROCEDURE — 85025 COMPLETE CBC W/AUTO DIFF WBC: CPT

## 2024-04-18 PROCEDURE — 1159F MED LIST DOCD IN RCRD: CPT | Performed by: NURSE PRACTITIONER

## 2024-04-18 PROCEDURE — 99214 OFFICE O/P EST MOD 30 MIN: CPT | Performed by: NURSE PRACTITIONER

## 2024-04-18 PROCEDURE — 3078F DIAST BP <80 MM HG: CPT | Performed by: NURSE PRACTITIONER

## 2024-04-18 PROCEDURE — 84153 ASSAY OF PSA TOTAL: CPT

## 2024-04-18 PROCEDURE — 85610 PROTHROMBIN TIME: CPT

## 2024-04-18 PROCEDURE — 85610 PROTHROMBIN TIME: CPT | Performed by: NURSE PRACTITIONER

## 2024-04-18 PROCEDURE — 80053 COMPREHEN METABOLIC PANEL: CPT

## 2024-04-18 PROCEDURE — 82607 VITAMIN B-12: CPT

## 2024-04-18 ASSESSMENT — ENCOUNTER SYMPTOMS
MUSCULOSKELETAL NEGATIVE: 1
GASTROINTESTINAL NEGATIVE: 1
CARDIOVASCULAR NEGATIVE: 1
CONSTITUTIONAL NEGATIVE: 1
NEUROLOGICAL NEGATIVE: 1
ENDOCRINE NEGATIVE: 1
RESPIRATORY NEGATIVE: 1
PSYCHIATRIC NEGATIVE: 1

## 2024-04-18 NOTE — PATIENT INSTRUCTIONS
Hold warfarin today (thursday 4-18-24)   Friday take 1/2 dose warfarin (1.5mg or 1/2 of a 3mg)  Rest days 3mg until seen by dr parker in [facility] [place]    Continue eating your spinach.

## 2024-04-20 LAB
PSA FREE MFR SERPL: <33 %
PSA FREE SERPL-MCNC: <0.1 NG/ML
PSA SERPL IA-MCNC: 0.3 NG/ML (ref 0–4)

## 2024-04-24 ENCOUNTER — OFFICE VISIT (OUTPATIENT)
Dept: PRIMARY CARE | Facility: CLINIC | Age: 84
End: 2024-04-24
Payer: MEDICARE

## 2024-04-24 VITALS
WEIGHT: 142 LBS | BODY MASS INDEX: 19.88 KG/M2 | HEART RATE: 65 BPM | RESPIRATION RATE: 16 BRPM | SYSTOLIC BLOOD PRESSURE: 113 MMHG | DIASTOLIC BLOOD PRESSURE: 70 MMHG | HEIGHT: 71 IN

## 2024-04-24 DIAGNOSIS — I48.91 ATRIAL FIBRILLATION, UNSPECIFIED TYPE (MULTI): Primary | ICD-10-CM

## 2024-04-24 LAB
POC INR: 1.6
POC PROTHROMBIN TIME: NORMAL

## 2024-04-24 PROCEDURE — 1036F TOBACCO NON-USER: CPT | Performed by: FAMILY MEDICINE

## 2024-04-24 PROCEDURE — 3074F SYST BP LT 130 MM HG: CPT | Performed by: FAMILY MEDICINE

## 2024-04-24 PROCEDURE — 3078F DIAST BP <80 MM HG: CPT | Performed by: FAMILY MEDICINE

## 2024-04-24 PROCEDURE — 1159F MED LIST DOCD IN RCRD: CPT | Performed by: FAMILY MEDICINE

## 2024-04-24 PROCEDURE — 99213 OFFICE O/P EST LOW 20 MIN: CPT | Performed by: FAMILY MEDICINE

## 2024-04-24 PROCEDURE — 85610 PROTHROMBIN TIME: CPT | Performed by: FAMILY MEDICINE

## 2024-04-24 PROCEDURE — 1160F RVW MEDS BY RX/DR IN RCRD: CPT | Performed by: FAMILY MEDICINE

## 2024-04-24 RX ORDER — WARFARIN 2 MG/1
TABLET ORAL
Qty: 30 TABLET | Refills: 1 | Status: SHIPPED | OUTPATIENT
Start: 2024-04-24 | End: 2024-05-01 | Stop reason: SDUPTHER

## 2024-04-24 NOTE — PROGRESS NOTES
Subjective   Patient ID: Gerry Celeste is a 83 y.o. male who presents for Coagulation Disorder (COUMADIN: 1.5MG FOR 6 DAYS ).  HPI  Patient with hx of Afib   HTN   HLD   Low vitamin D   Prostate cancer   Raynauds;s   WILMER   lives in [facility]   his son Brian in [facility]   retired   not currently in distress   INR 1.6    Patient has been taking warfarin 1.5 mg daily for the last 5 days   Eating spinach for the last few days   Feeling well , no bleeding   Review of Systems    Past Medical History:   Diagnosis Date    Essential hypertension, benign     Paroxysmal atrial fibrillation (Multi)        Past Surgical History:   Procedure Laterality Date    CARPAL TUNNEL RELEASE Bilateral     HERNIA REPAIR      REVERSE TOTAL SHOULDER ARTHROPLASTY Left       Social History     Socioeconomic History    Marital status: Unknown     Spouse name: None    Number of children: None    Years of education: None    Highest education level: None   Occupational History    None   Tobacco Use    Smoking status: Never    Smokeless tobacco: Never   Substance and Sexual Activity    Alcohol use: Not Currently    Drug use: Never    Sexual activity: None   Other Topics Concern    None   Social History Narrative    None     Social Determinants of Health     Financial Resource Strain: Not on file   Food Insecurity: Not on file   Transportation Needs: Not on file   Physical Activity: Not on file   Stress: Not on file   Social Connections: Not on file   Intimate Partner Violence: Not on file   Housing Stability: Not on file      Family History   Problem Relation Name Age of Onset    Stroke Father         MEDICATIONS AND ALLERGIES:    ALLERGIES Patient has no known allergies.    MEDICATIONS   Current Outpatient Medications on File Prior to Visit   Medication Sig Dispense Refill    Alphagan P 0.1 % ophthalmic solution Administer 1 drop into both eyes. one hour after omnipred bid -      aspirin 81 mg chewable tablet Chew 1 tablet (81 mg) once  "daily.      cholecalciferol (Vitamin D-3) 5,000 Units tablet Take by mouth.  normally 10,000. he cut down to 5000 as of 5/20/22      dorzolamide-timoloL (Cosopt) 22.3-6.8 mg/mL ophthalmic solution       furosemide (Lasix) 20 mg tablet Take 1 tablet (20 mg) by mouth once daily as needed (instruct for swelling). per dr sumner 30 tablet 1    Lumigan 0.01 % ophthalmic solution Administer 1 drop into both eyes once daily at bedtime.      melatonin 5 mg capsule Take 1 capsule by mouth once daily at bedtime.      nitroglycerin (Nitrostat) 0.4 mg SL tablet Place 1 tablet (0.4 mg) under the tongue every 5 minutes if needed.  If no relief after 1 dose, call 911.      potassium chloride ER (Micro-K) 10 mEq ER capsule Take 1 capsule (10 mEq) by mouth once daily. when on the furosemide/lasix      risedronate (Actonel) 150 mg tablet Take 1 tablet (150 mg) by mouth every 30 (thirty) days.      simvastatin (Zocor) 10 mg tablet TAKE 1 TABLET BY MOUTH ONCE A DAY 90 tablet 3    sotalol (Betapace) 120 mg tablet Take 1 tablet (120 mg) by mouth every 12 hours. 9am and 9pm      tamsulosin (Flomax) 0.4 mg 24 hr capsule Take 1 capsule (0.4 mg) by mouth 2 times a day.      warfarin (Coumadin) 3 mg tablet Take 1 tablet (3 mg) by mouth once daily in the evening. 90 tablet 3    zoledronic acid (Reclast) 5 mg/100 mL piggyback Infuse 100 mL (5 mg) into a venous catheter 1 time.      zoledronic acid (Zometa) 4 mg/5 mL injection Infuse into a venous catheter.  for osteoporosis - given 3/29/21 yearly injection - [facility]       No current facility-administered medications on file prior to visit.        Objective   Visit Vitals  /70   Pulse 65   Resp 16   Ht 1.803 m (5' 10.98\")   Wt 64.4 kg (142 lb)   BMI 19.81 kg/m²   Smoking Status Never   BSA 1.8 m²          3/14/2024     1:33 PM 3/21/2024     1:12 PM 3/28/2024    10:55 AM 4/4/2024     1:51 PM 4/11/2024    10:33 AM 4/18/2024    10:42 AM 4/24/2024    11:20 AM   Vitals   Systolic " "112 120 118 120 118 108 113   Diastolic 68 68 64 72 62 64 70   Heart Rate 72 70  70 66 62 65   Resp 10    10 16 16   Height (in) 1.816 m (5' 11.5\") 1.816 m (5' 11.5\") 1.816 m (5' 11.5\") 1.816 m (5' 11.5\") 1.803 m (5' 11\") 1.803 m (5' 10.98\") 1.803 m (5' 10.98\")   Weight (lb) 146 145.6 142 143 153 144 142   BMI 20.08 kg/m2 20.02 kg/m2 19.53 kg/m2 19.67 kg/m2 21.34 kg/m2 20.09 kg/m2 19.81 kg/m2   BSA (m2) 1.83 m2 1.82 m2 1.8 m2 1.81 m2 1.86 m2 1.81 m2 1.8 m2   Visit Report Report Report Report Report Report Report Report     Physical Exam  No active bleeding  vitals stable       1. Atrial fibrillation, unspecified type (Multi)  INR 1.6   Will increase the dose to 2mg PO daily   Recheck in 1 week   Risk of bleeding explaiend, patient verbalized understanding.   - warfarin (Coumadin) 2 mg tablet; Take as directed per After Visit Summary.  Dispense: 30 tablet; Refill: 1               "

## 2024-05-01 ENCOUNTER — TELEPHONE (OUTPATIENT)
Dept: PRIMARY CARE | Facility: CLINIC | Age: 84
End: 2024-05-01

## 2024-05-01 ENCOUNTER — OFFICE VISIT (OUTPATIENT)
Dept: PRIMARY CARE | Facility: CLINIC | Age: 84
End: 2024-05-01
Payer: MEDICARE

## 2024-05-01 VITALS
TEMPERATURE: 97.7 F | SYSTOLIC BLOOD PRESSURE: 102 MMHG | HEART RATE: 80 BPM | RESPIRATION RATE: 16 BRPM | DIASTOLIC BLOOD PRESSURE: 63 MMHG

## 2024-05-01 DIAGNOSIS — I48.91 ATRIAL FIBRILLATION, UNSPECIFIED TYPE (MULTI): ICD-10-CM

## 2024-05-01 DIAGNOSIS — D64.9 ANEMIA, UNSPECIFIED TYPE: Primary | ICD-10-CM

## 2024-05-01 LAB
POC INR: 1.8
POC PROTHROMBIN TIME: NORMAL

## 2024-05-01 PROCEDURE — 1159F MED LIST DOCD IN RCRD: CPT | Performed by: FAMILY MEDICINE

## 2024-05-01 PROCEDURE — 3074F SYST BP LT 130 MM HG: CPT | Performed by: FAMILY MEDICINE

## 2024-05-01 PROCEDURE — 1160F RVW MEDS BY RX/DR IN RCRD: CPT | Performed by: FAMILY MEDICINE

## 2024-05-01 PROCEDURE — 99213 OFFICE O/P EST LOW 20 MIN: CPT | Performed by: FAMILY MEDICINE

## 2024-05-01 PROCEDURE — 85610 PROTHROMBIN TIME: CPT | Performed by: FAMILY MEDICINE

## 2024-05-01 PROCEDURE — 3078F DIAST BP <80 MM HG: CPT | Performed by: FAMILY MEDICINE

## 2024-05-01 RX ORDER — WARFARIN 2 MG/1
2 TABLET ORAL EVERY EVENING
Qty: 90 TABLET | Refills: 1 | Status: SHIPPED
Start: 2024-05-01 | End: 2024-06-03 | Stop reason: SDUPTHER

## 2024-05-01 RX ORDER — WARFARIN 2 MG/1
2 TABLET ORAL EVERY EVENING
Qty: 30 TABLET | Refills: 1 | Status: SHIPPED | OUTPATIENT
Start: 2024-05-01 | End: 2024-05-01 | Stop reason: SDUPTHER

## 2024-05-01 NOTE — TELEPHONE ENCOUNTER
----- Message from Gerry Ritter MD sent at 5/1/2024  1:54 PM EDT -----  Regarding: LABS FOR FLUP W/ DR HERNÁNDEZ    ----- Message -----  From: Jeromy Hernández MD  Sent: 4/25/2024   2:39 PM EDT  To: Laura L Seaver, APRN-CNP    I will keep a close eye on him , I am seeing him again next week.   ----- Message -----  From: Laura L Seaver, APRN-CNP  Sent: 4/23/2024   8:14 AM EDT  To: Jeromy Hernández MD    Good morning dr hernández.  He has an appt upcoming with you to check inr while i am out of the office. I ran these labs on him he says he is not an eater when he is upset. See below.     Would you please review his labs with me? All look great he is >79 yrs. Hx of prostate cancer and current with dr roque. However he is losing wt. He has lost a son tragically to a PE age 50's recently and in grief counseling for that. But see his cbc. What do you think on that?     Thanks in advance - nunu MORALES    ----- Message -----  From: Lab, Background User  Sent: 4/18/2024   4:00 PM EDT  To: Laura L Seaver, APRN-CNP

## 2024-05-01 NOTE — PROGRESS NOTES
Subjective   Patient ID: Gerry Celeste is a 83 y.o. male who presents for Follow-up (INR CHECK/).  HPIpatient with hx of   HTN   HLD   Low vitamin D   Prostate cancer   Raynauds;s   WILMER   lives in [facility]   his son Brian in [facility]   retired   not currently in distress   INR 1.6    Patient has been taking warfarin 1.5 mg daily for the last 5 days   Eating spinach for the last few days   Feeling well , no bleeding   During last visit his INR was 1.6, so warfarin was increased to 2 mg daily.  Today INR is 1.8 which is acceptable for age.  Not having any bleeding.  Recent blood workup showed slight drop in blood counts, to monitoring this closely, he denied having any symptoms of bleeding, no hematochezia, no melena, no hemoptysis, no other new symptoms.  Review of Systems    Past Medical History:   Diagnosis Date    Essential hypertension, benign     Paroxysmal atrial fibrillation (Multi)        Past Surgical History:   Procedure Laterality Date    CARPAL TUNNEL RELEASE Bilateral     HERNIA REPAIR      REVERSE TOTAL SHOULDER ARTHROPLASTY Left       Social History     Socioeconomic History    Marital status: Unknown     Spouse name: Not on file    Number of children: Not on file    Years of education: Not on file    Highest education level: Not on file   Occupational History    Not on file   Tobacco Use    Smoking status: Never    Smokeless tobacco: Never   Substance and Sexual Activity    Alcohol use: Not Currently    Drug use: Never    Sexual activity: Not on file   Other Topics Concern    Not on file   Social History Narrative    Not on file     Social Determinants of Health     Financial Resource Strain: Not on file   Food Insecurity: Not on file   Transportation Needs: Not on file   Physical Activity: Not on file   Stress: Not on file   Social Connections: Not on file   Intimate Partner Violence: Not on file   Housing Stability: Not on file      Family History   Problem Relation Name Age of Onset     Stroke Father         MEDICATIONS AND ALLERGIES:    ALLERGIES Patient has no known allergies.    MEDICATIONS   Current Outpatient Medications on File Prior to Visit   Medication Sig Dispense Refill    Alphagan P 0.1 % ophthalmic solution Administer 1 drop into both eyes. one hour after omnipred bid -      aspirin 81 mg chewable tablet Chew 1 tablet (81 mg) once daily.      cholecalciferol (Vitamin D-3) 5,000 Units tablet Take by mouth.  normally 10,000. he cut down to 5000 as of 5/20/22      dorzolamide-timoloL (Cosopt) 22.3-6.8 mg/mL ophthalmic solution       furosemide (Lasix) 20 mg tablet Take 1 tablet (20 mg) by mouth once daily as needed (instruct for swelling). per dr sumner 30 tablet 1    Lumigan 0.01 % ophthalmic solution Administer 1 drop into both eyes once daily at bedtime.      melatonin 5 mg capsule Take 1 capsule by mouth once daily at bedtime.      nitroglycerin (Nitrostat) 0.4 mg SL tablet Place 1 tablet (0.4 mg) under the tongue every 5 minutes if needed.  If no relief after 1 dose, call 911.      potassium chloride ER (Micro-K) 10 mEq ER capsule Take 1 capsule (10 mEq) by mouth once daily. when on the furosemide/lasix      risedronate (Actonel) 150 mg tablet Take 1 tablet (150 mg) by mouth every 30 (thirty) days.      simvastatin (Zocor) 10 mg tablet TAKE 1 TABLET BY MOUTH ONCE A DAY 90 tablet 3    sotalol (Betapace) 120 mg tablet Take 1 tablet (120 mg) by mouth every 12 hours. 9am and 9pm      tamsulosin (Flomax) 0.4 mg 24 hr capsule Take 1 capsule (0.4 mg) by mouth 2 times a day.      warfarin (Coumadin) 2 mg tablet Take as directed per After Visit Summary. 30 tablet 1    zoledronic acid (Reclast) 5 mg/100 mL piggyback Infuse 100 mL (5 mg) into a venous catheter 1 time.      zoledronic acid (Zometa) 4 mg/5 mL injection Infuse into a venous catheter.  for osteoporosis - given 3/29/21 yearly injection - [facility]       No current facility-administered medications on file prior to  "visit.        Objective   Visit Vitals  /63   Pulse 80   Temp 36.5 °C (97.7 °F) (Temporal)   Resp 16   Smoking Status Never          3/21/2024     1:12 PM 3/28/2024    10:55 AM 4/4/2024     1:51 PM 4/11/2024    10:33 AM 4/18/2024    10:42 AM 4/24/2024    11:20 AM 5/1/2024    10:39 AM   Vitals   Systolic 120 118 120 118 108 113 102   Diastolic 68 64 72 62 64 70 63   Heart Rate 70  70 66 62 65 80   Temp       36.5 °C (97.7 °F)   Resp    10 16 16 16   Height (in) 1.816 m (5' 11.5\") 1.816 m (5' 11.5\") 1.816 m (5' 11.5\") 1.803 m (5' 11\") 1.803 m (5' 10.98\") 1.803 m (5' 10.98\")    Weight (lb) 145.6 142 143 153 144 142    BMI 20.02 kg/m2 19.53 kg/m2 19.67 kg/m2 21.34 kg/m2 20.09 kg/m2 19.81 kg/m2    BSA (m2) 1.82 m2 1.8 m2 1.81 m2 1.86 m2 1.81 m2 1.8 m2    Visit Report Report Report Report Report Report Report Report     Physical Exam    Constitutional: awake; alert, interactive; in no acute distress; well nourished and well developed  ENT: ears and nose were normal in appearance;  Eyes: pupils equal and round  Pulmonary: no respiratory distress and normal respiratory rhythm and effort  Skin: normal skin color and pigmentation;  Psychiatric: oriented to person, place, and time; affect was normal and the mood was normal       1. Atrial fibrillation, unspecified type (Multi)  INR today was 1.8, continue same treatment, follow-up in 1 month.  - warfarin (Coumadin) 2 mg tablet; Take 1 tablet (2 mg) by mouth once daily in the evening. Take as directed per After Visit Summary.  Dispense: 90 tablet; Refill: 1      2. Anemia, unspecified type  No acute symptoms, no evidence of bleeding, advised to eat a healthy diet, will follow-up in one 1 month for recheck.  Patient agreed on the plan         "

## 2024-05-20 ENCOUNTER — TELEPHONE (OUTPATIENT)
Dept: PRIMARY CARE | Facility: CLINIC | Age: 84
End: 2024-05-20

## 2024-05-20 ENCOUNTER — OFFICE VISIT (OUTPATIENT)
Dept: PRIMARY CARE | Facility: CLINIC | Age: 84
End: 2024-05-20
Payer: MEDICARE

## 2024-05-20 VITALS
SYSTOLIC BLOOD PRESSURE: 116 MMHG | RESPIRATION RATE: 16 BRPM | TEMPERATURE: 98 F | BODY MASS INDEX: 19.53 KG/M2 | DIASTOLIC BLOOD PRESSURE: 76 MMHG | HEART RATE: 60 BPM | WEIGHT: 140 LBS

## 2024-05-20 DIAGNOSIS — D64.9 ANEMIA, UNSPECIFIED TYPE: ICD-10-CM

## 2024-05-20 DIAGNOSIS — I48.91 ATRIAL FIBRILLATION, UNSPECIFIED TYPE (MULTI): ICD-10-CM

## 2024-05-20 DIAGNOSIS — R79.1 INR (INTERNATIONAL NORMAL RATIO) ABNORMAL: Primary | ICD-10-CM

## 2024-05-20 DIAGNOSIS — R79.1 INR (INTERNATIONAL NORMAL RATIO) ABNORMAL: ICD-10-CM

## 2024-05-20 LAB
POC INR: 3.6
POC PROTHROMBIN TIME: NORMAL

## 2024-05-20 PROCEDURE — 99213 OFFICE O/P EST LOW 20 MIN: CPT | Performed by: FAMILY MEDICINE

## 2024-05-20 PROCEDURE — 85610 PROTHROMBIN TIME: CPT | Performed by: FAMILY MEDICINE

## 2024-05-20 PROCEDURE — 1160F RVW MEDS BY RX/DR IN RCRD: CPT | Performed by: FAMILY MEDICINE

## 2024-05-20 PROCEDURE — 1159F MED LIST DOCD IN RCRD: CPT | Performed by: FAMILY MEDICINE

## 2024-05-20 PROCEDURE — 3078F DIAST BP <80 MM HG: CPT | Performed by: FAMILY MEDICINE

## 2024-05-20 PROCEDURE — 3074F SYST BP LT 130 MM HG: CPT | Performed by: FAMILY MEDICINE

## 2024-05-20 RX ORDER — WARFARIN 1 MG/1
TABLET ORAL
Qty: 90 TABLET | Refills: 1 | Status: SHIPPED | OUTPATIENT
Start: 2024-05-20 | End: 2024-05-20 | Stop reason: SDUPTHER

## 2024-05-20 RX ORDER — WARFARIN 1 MG/1
TABLET ORAL
Qty: 90 TABLET | Refills: 1 | Status: SHIPPED | OUTPATIENT
Start: 2024-05-20 | End: 2024-06-03 | Stop reason: SDUPTHER

## 2024-05-20 RX ORDER — TROSPIUM CHLORIDE 20 MG/1
TABLET, FILM COATED ORAL EVERY 12 HOURS
COMMUNITY

## 2024-05-20 NOTE — PATIENT INSTRUCTIONS
SKIP THE DOSE ON 05/20/2024   THE NEW DOSING IS:   1MG ON TUESDAY AND THURSDAY ( I SENT YOU A NEW SCRIPT )   AND 2 MG THE REST OF THE WEEK.     RECHECK LEVELS IN 2 WEEKS.

## 2024-05-20 NOTE — PROGRESS NOTES
Subjective   Patient ID: Gerry Celeste is a 83 y.o. male who presents for Follow-up (INR CHECK ).  HPIINR 3.6 today   No bleeding   Was working in his yard all weekend.   patient with hx of   HTN   HLD   Low vitamin D   Prostate cancer   Raynauds;s   WILMER   lives in [facility]   his son Brian in [facility]   retired   not currently in distress   INR 1.6    Patient has been taking warfarin 1.5 mg daily for the last 5 days   Eating spinach for the last few days   Feeling well , no bleeding   During last visit his INR was 1.6, so warfarin was increased to 2 mg daily.  Today INR is 1.8 which is acceptable for age.  Not having any bleeding.  Recent blood workup showed slight drop in blood counts, to monitoring this closely, he denied having any symptoms of bleeding, no hematochezia, no melena, no hemoptysis, no other new symptoms.  Review of Systems    Past Medical History:   Diagnosis Date    Essential hypertension, benign     Paroxysmal atrial fibrillation (Multi)        Past Surgical History:   Procedure Laterality Date    CARPAL TUNNEL RELEASE Bilateral     HERNIA REPAIR      REVERSE TOTAL SHOULDER ARTHROPLASTY Left       Social History     Socioeconomic History    Marital status: Unknown     Spouse name: Not on file    Number of children: Not on file    Years of education: Not on file    Highest education level: Not on file   Occupational History    Not on file   Tobacco Use    Smoking status: Never    Smokeless tobacco: Never   Substance and Sexual Activity    Alcohol use: Not Currently    Drug use: Never    Sexual activity: Not on file   Other Topics Concern    Not on file   Social History Narrative    Not on file     Social Determinants of Health     Financial Resource Strain: Not on file   Food Insecurity: Not on file   Transportation Needs: Not on file   Physical Activity: Not on file   Stress: Not on file   Social Connections: Not on file   Intimate Partner Violence: Not on file   Housing Stability: Not on  file      Family History   Problem Relation Name Age of Onset    Stroke Father         MEDICATIONS AND ALLERGIES:    ALLERGIES Patient has no known allergies.    MEDICATIONS   Current Outpatient Medications on File Prior to Visit   Medication Sig Dispense Refill    Alphagan P 0.1 % ophthalmic solution Administer 1 drop into both eyes. one hour after omnipred bid -      aspirin 81 mg chewable tablet Chew 1 tablet (81 mg) once daily.      cholecalciferol (Vitamin D-3) 5,000 Units tablet Take by mouth.  normally 10,000. he cut down to 5000 as of 5/20/22      dorzolamide-timoloL (Cosopt) 22.3-6.8 mg/mL ophthalmic solution       furosemide (Lasix) 20 mg tablet Take 1 tablet (20 mg) by mouth once daily as needed (instruct for swelling). per dr sumner 30 tablet 1    Lumigan 0.01 % ophthalmic solution Administer 1 drop into both eyes once daily at bedtime.      melatonin 5 mg capsule Take 1 capsule by mouth once daily at bedtime.      nitroglycerin (Nitrostat) 0.4 mg SL tablet Place 1 tablet (0.4 mg) under the tongue every 5 minutes if needed.  If no relief after 1 dose, call 911.      potassium chloride ER (Micro-K) 10 mEq ER capsule Take 1 capsule (10 mEq) by mouth once daily. when on the furosemide/lasix      risedronate (Actonel) 150 mg tablet Take 1 tablet (150 mg) by mouth every 30 (thirty) days.      simvastatin (Zocor) 10 mg tablet TAKE 1 TABLET BY MOUTH ONCE A DAY 90 tablet 3    sotalol (Betapace) 120 mg tablet Take 1 tablet (120 mg) by mouth every 12 hours. 9am and 9pm      tamsulosin (Flomax) 0.4 mg 24 hr capsule Take 1 capsule (0.4 mg) by mouth 2 times a day.      trospium (Sanctura) 20 mg tablet every 12 hours.      warfarin (Coumadin) 2 mg tablet Take 1 tablet (2 mg) by mouth once daily in the evening. Take as directed per After Visit Summary. 90 tablet 1    zoledronic acid (Reclast) 5 mg/100 mL piggyback Infuse 100 mL (5 mg) into a venous catheter 1 time.      zoledronic acid (Zometa) 4 mg/5 mL injection  "Infuse into a venous catheter.  for osteoporosis - given 3/29/21 yearly injection - [facility]       No current facility-administered medications on file prior to visit.        Objective   Visit Vitals  /76 (BP Location: Left arm, Patient Position: Sitting)   Pulse 60   Temp 36.7 °C (98 °F) (Temporal)   Resp 16   Wt 63.5 kg (140 lb)   BMI 19.53 kg/m²   Smoking Status Never   BSA 1.78 m²          3/28/2024    10:55 AM 4/4/2024     1:51 PM 4/11/2024    10:33 AM 4/18/2024    10:42 AM 4/24/2024    11:20 AM 5/1/2024    10:39 AM 5/20/2024    10:17 AM   Vitals   Systolic 118 120 118 108 113 102 116   Diastolic 64 72 62 64 70 63 76   Heart Rate  70 66 62 65 80 60   Temp      36.5 °C (97.7 °F) 36.7 °C (98 °F)   Resp   10 16 16 16 16   Height (in) 1.816 m (5' 11.5\") 1.816 m (5' 11.5\") 1.803 m (5' 11\") 1.803 m (5' 10.98\") 1.803 m (5' 10.98\")     Weight (lb) 142 143 153 144 142  140   BMI 19.53 kg/m2 19.67 kg/m2 21.34 kg/m2 20.09 kg/m2 19.81 kg/m2  19.53 kg/m2   BSA (m2) 1.8 m2 1.81 m2 1.86 m2 1.81 m2 1.8 m2  1.78 m2   Visit Report Report Report Report Report Report Report Report     Physical Exam  Constitutional:       Appearance: Normal appearance.   HENT:      Head: Normocephalic and atraumatic.   Eyes:      Pupils: Pupils are equal, round, and reactive to light.   Cardiovascular:      Rate and Rhythm: Normal rate.   Pulmonary:      Effort: Pulmonary effort is normal.   Musculoskeletal:         General: No swelling.      Cervical back: Normal range of motion.   Skin:     Coloration: Skin is not jaundiced.   Neurological:      General: No focal deficit present.      Mental Status: He is alert and oriented to person, place, and time.             1. INR (international normal ratio) abnormal  INR 3.6   Will skip the dose today   Cut down the dose from 2mg daily   To 1mg Tuesday and Thursday   2mg rest of the week   Recheck levels in 2 weeks.     2. Atrial fibrillation, unspecified type (Multi)  As above.   - " warfarin (Coumadin) 1 mg tablet; Take as directed in the visit summary.  Dispense: 90 tablet; Refill: 1    3. Anemia, unspecified type  Recheck levels in 1 month

## 2024-05-20 NOTE — TELEPHONE ENCOUNTER
"Warfarin 1mg tablet script needs specific directions per pharmacist  Can't bill insurance with \"take as directed\"    "

## 2024-06-03 ENCOUNTER — OFFICE VISIT (OUTPATIENT)
Dept: PRIMARY CARE | Facility: CLINIC | Age: 84
End: 2024-06-03
Payer: MEDICARE

## 2024-06-03 VITALS
BODY MASS INDEX: 19.18 KG/M2 | HEIGHT: 71 IN | WEIGHT: 137 LBS | DIASTOLIC BLOOD PRESSURE: 66 MMHG | RESPIRATION RATE: 16 BRPM | SYSTOLIC BLOOD PRESSURE: 111 MMHG | HEART RATE: 70 BPM

## 2024-06-03 DIAGNOSIS — I48.91 ATRIAL FIBRILLATION, UNSPECIFIED TYPE (MULTI): ICD-10-CM

## 2024-06-03 DIAGNOSIS — R79.1 INR (INTERNATIONAL NORMAL RATIO) ABNORMAL: ICD-10-CM

## 2024-06-03 DIAGNOSIS — D64.9 ANEMIA, UNSPECIFIED TYPE: ICD-10-CM

## 2024-06-03 LAB
POC INR: 2.4
POC PROTHROMBIN TIME: NORMAL

## 2024-06-03 PROCEDURE — 3078F DIAST BP <80 MM HG: CPT | Performed by: FAMILY MEDICINE

## 2024-06-03 PROCEDURE — 3074F SYST BP LT 130 MM HG: CPT | Performed by: FAMILY MEDICINE

## 2024-06-03 PROCEDURE — 1036F TOBACCO NON-USER: CPT | Performed by: FAMILY MEDICINE

## 2024-06-03 PROCEDURE — 1159F MED LIST DOCD IN RCRD: CPT | Performed by: FAMILY MEDICINE

## 2024-06-03 PROCEDURE — 99213 OFFICE O/P EST LOW 20 MIN: CPT | Performed by: FAMILY MEDICINE

## 2024-06-03 PROCEDURE — 85610 PROTHROMBIN TIME: CPT | Performed by: FAMILY MEDICINE

## 2024-06-03 RX ORDER — WARFARIN 1 MG/1
TABLET ORAL
Qty: 90 TABLET | Refills: 1 | Status: SHIPPED | OUTPATIENT
Start: 2024-06-03 | End: 2025-06-03

## 2024-06-03 RX ORDER — WARFARIN 2 MG/1
TABLET ORAL
Qty: 90 TABLET | Refills: 1 | Status: SHIPPED | OUTPATIENT
Start: 2024-06-03

## 2024-06-03 NOTE — PROGRESS NOTES
"Subjective   Patient ID: Gerry Celeste is a 83 y.o. male who presents for Coagulation Disorder (1MG ON TUESDAY AND THURSDAY ( I SENT YOU A NEW SCRIPT ) /AND 2 MG THE REST OF THE WEEK. /).  HPI  INR last time was 3.6   Dose was decreased   No recent bleeding.   Review of Systems    Objective   Visit Vitals  Ht 1.803 m (5' 10.98\")   Wt 62.1 kg (137 lb)   BMI 19.12 kg/m²   Smoking Status Never   BSA 1.76 m²      Physical Exam    Diagnoses and all orders for this visit:  INR (international normal ratio) abnormal  -     Follow Up In Primary Care - Established  Atrial fibrillation, unspecified type (Multi)  -     Follow Up In Primary Care - Established  Anemia, unspecified type  -     Follow Up In Primary Care - Established     1. INR (international normal ratio) abnormal  (1MG ON TUESDAY AND THURSDAY ( I SENT YOU A NEW SCRIPT ) /AND 2 MG THE REST OF THE WEEK. /).  - Follow Up In Primary Care - Established  - POCT INR manually resulted    2. Atrial fibrillation, unspecified type (Multi)  Stable   - Follow Up In Primary Care - Established  - POCT INR manually resulted    3. Anemia, unspecified type  Will follow up in 1 month   - Follow Up In Primary Care - Established  - POCT INR manually resulted          "

## 2024-06-11 ENCOUNTER — APPOINTMENT (OUTPATIENT)
Dept: PRIMARY CARE | Facility: CLINIC | Age: 84
End: 2024-06-11
Payer: MEDICARE

## 2024-07-08 ENCOUNTER — APPOINTMENT (OUTPATIENT)
Dept: PRIMARY CARE | Facility: CLINIC | Age: 84
End: 2024-07-08
Payer: MEDICARE

## 2024-07-08 VITALS
HEART RATE: 70 BPM | SYSTOLIC BLOOD PRESSURE: 100 MMHG | BODY MASS INDEX: 18.98 KG/M2 | DIASTOLIC BLOOD PRESSURE: 60 MMHG | WEIGHT: 136 LBS | TEMPERATURE: 97.7 F | RESPIRATION RATE: 16 BRPM

## 2024-07-08 DIAGNOSIS — R79.1 INR (INTERNATIONAL NORMAL RATIO) ABNORMAL: ICD-10-CM

## 2024-07-08 DIAGNOSIS — I05.9 MITRAL VALVE DISORDER: ICD-10-CM

## 2024-07-08 DIAGNOSIS — M48.00 SPINAL STENOSIS, UNSPECIFIED SPINAL REGION: ICD-10-CM

## 2024-07-08 DIAGNOSIS — Z00.00 ENCOUNTER FOR ANNUAL WELLNESS EXAM IN MEDICARE PATIENT: Primary | ICD-10-CM

## 2024-07-08 DIAGNOSIS — I48.91 ATRIAL FIBRILLATION, UNSPECIFIED TYPE (MULTI): ICD-10-CM

## 2024-07-08 DIAGNOSIS — E78.2 MODERATE MIXED HYPERLIPIDEMIA NOT REQUIRING STATIN THERAPY: ICD-10-CM

## 2024-07-08 DIAGNOSIS — R97.20 RAISED PROSTATE SPECIFIC ANTIGEN: ICD-10-CM

## 2024-07-08 DIAGNOSIS — C61 MALIGNANT TUMOR OF PROSTATE (MULTI): ICD-10-CM

## 2024-07-08 DIAGNOSIS — G47.33 OSA (OBSTRUCTIVE SLEEP APNEA): ICD-10-CM

## 2024-07-08 DIAGNOSIS — E46 PROTEIN-CALORIE MALNUTRITION, UNSPECIFIED SEVERITY (MULTI): ICD-10-CM

## 2024-07-08 DIAGNOSIS — N13.8 BENIGN PROSTATIC HYPERPLASIA WITH URINARY OBSTRUCTION: ICD-10-CM

## 2024-07-08 DIAGNOSIS — N40.1 BENIGN PROSTATIC HYPERPLASIA WITH URINARY OBSTRUCTION: ICD-10-CM

## 2024-07-08 LAB
POC INR: 4.1
POC PROTHROMBIN TIME: NORMAL

## 2024-07-08 PROCEDURE — 99213 OFFICE O/P EST LOW 20 MIN: CPT | Performed by: FAMILY MEDICINE

## 2024-07-08 PROCEDURE — 1123F ACP DISCUSS/DSCN MKR DOCD: CPT | Performed by: FAMILY MEDICINE

## 2024-07-08 PROCEDURE — G0439 PPPS, SUBSEQ VISIT: HCPCS | Performed by: FAMILY MEDICINE

## 2024-07-08 PROCEDURE — 1170F FXNL STATUS ASSESSED: CPT | Performed by: FAMILY MEDICINE

## 2024-07-08 PROCEDURE — 85610 PROTHROMBIN TIME: CPT | Performed by: FAMILY MEDICINE

## 2024-07-08 PROCEDURE — 1158F ADVNC CARE PLAN TLK DOCD: CPT | Performed by: FAMILY MEDICINE

## 2024-07-08 PROCEDURE — 3078F DIAST BP <80 MM HG: CPT | Performed by: FAMILY MEDICINE

## 2024-07-08 PROCEDURE — 3074F SYST BP LT 130 MM HG: CPT | Performed by: FAMILY MEDICINE

## 2024-07-08 RX ORDER — WARFARIN 1 MG/1
TABLET ORAL
Qty: 90 TABLET | Refills: 3 | Status: SHIPPED | OUTPATIENT
Start: 2024-07-08 | End: 2025-07-08

## 2024-07-08 ASSESSMENT — ACTIVITIES OF DAILY LIVING (ADL)
TAKING_MEDICATION: INDEPENDENT
DOING_HOUSEWORK: INDEPENDENT
DRESSING: INDEPENDENT
GROCERY_SHOPPING: INDEPENDENT
BATHING: INDEPENDENT
MANAGING_FINANCES: INDEPENDENT

## 2024-07-08 ASSESSMENT — PATIENT HEALTH QUESTIONNAIRE - PHQ9
SUM OF ALL RESPONSES TO PHQ9 QUESTIONS 1 AND 2: 0
2. FEELING DOWN, DEPRESSED OR HOPELESS: NOT AT ALL
1. LITTLE INTEREST OR PLEASURE IN DOING THINGS: NOT AT ALL

## 2024-07-08 NOTE — PROGRESS NOTES
Subjective   Patient ID: Gerry Celeste is a 83 y.o. male who presents for Medicare Annual Wellness Visit Subsequent.  HPIPatient with hx of prostate cancer , post radiotherapy   Patient with hx of Afib   HTN   HLD   Low vitamin D   Prostate cancer   Raynauds;s   WILMER   lives in [facility]   his son Brian in [facility]   retired   not currently in distress     Cardiologist is Dr. Dr. Baltazar Painting MD he still follows with him.   Urologist is Dr. Blackman. MERLIN Paredes NP     He did not take his warfarin today , he went to the hospital in [place].     INR today 4.1 , his son Adrián came back from [place]. John has a wife and mother in law.     Patient with no bleeding.     His son passed away last year from massive PE.      since 2014    Worked in [employer] , was on .   Patient is a  , 60 years ago .   He served in [employer] .   Was in [employer].             Review of Systems    Past Medical History:   Diagnosis Date    Essential hypertension, benign     Paroxysmal atrial fibrillation (Multi)        Past Surgical History:   Procedure Laterality Date    CARPAL TUNNEL RELEASE Bilateral     HERNIA REPAIR      REVERSE TOTAL SHOULDER ARTHROPLASTY Left       Social History     Socioeconomic History    Marital status: Unknown     Spouse name: Not on file    Number of children: Not on file    Years of education: Not on file    Highest education level: Not on file   Occupational History    Not on file   Tobacco Use    Smoking status: Never    Smokeless tobacco: Never   Substance and Sexual Activity    Alcohol use: Not Currently    Drug use: Never    Sexual activity: Not on file   Other Topics Concern    Not on file   Social History Narrative    Not on file     Social Determinants of Health     Financial Resource Strain: Not on file   Food Insecurity: Not on file   Transportation Needs: Not on file   Physical Activity: Not on file   Stress: Not on file   Social Connections:  Not on file   Intimate Partner Violence: Not on file   Housing Stability: Not on file      Family History   Problem Relation Name Age of Onset    Stroke Father         MEDICATIONS AND ALLERGIES:    ALLERGIES Patient has no known allergies.    MEDICATIONS   Current Outpatient Medications on File Prior to Visit   Medication Sig Dispense Refill    Alphagan P 0.1 % ophthalmic solution Administer 1 drop into both eyes. one hour after omnipred bid -      aspirin 81 mg chewable tablet Chew 1 tablet (81 mg) once daily.      cholecalciferol (Vitamin D-3) 5,000 Units tablet Take by mouth.  normally 10,000. he cut down to 5000 as of 5/20/22      dorzolamide-timoloL (Cosopt) 22.3-6.8 mg/mL ophthalmic solution       furosemide (Lasix) 20 mg tablet Take 1 tablet (20 mg) by mouth once daily as needed (instruct for swelling). per dr sumner 30 tablet 1    Lumigan 0.01 % ophthalmic solution Administer 1 drop into both eyes once daily at bedtime.      melatonin 5 mg capsule Take 1 capsule by mouth once daily at bedtime.      nitroglycerin (Nitrostat) 0.4 mg SL tablet Place 1 tablet (0.4 mg) under the tongue every 5 minutes if needed.  If no relief after 1 dose, call 911.      potassium chloride ER (Micro-K) 10 mEq ER capsule Take 1 capsule (10 mEq) by mouth once daily. when on the furosemide/lasix      risedronate (Actonel) 150 mg tablet Take 1 tablet (150 mg) by mouth every 30 (thirty) days.      simvastatin (Zocor) 10 mg tablet TAKE 1 TABLET BY MOUTH ONCE A DAY 90 tablet 3    sotalol (Betapace) 120 mg tablet Take 1 tablet (120 mg) by mouth every 12 hours. 9am and 9pm      tamsulosin (Flomax) 0.4 mg 24 hr capsule Take 1 capsule (0.4 mg) by mouth 2 times a day.      trospium (Sanctura) 20 mg tablet every 12 hours.      warfarin (Coumadin) 1 mg tablet Take 1mg on Tuesday and Thursday and 2 mg the rest of the week. 90 tablet 1    warfarin (Coumadin) 2 mg tablet 1MG ON TUESDAY AND THURSDAY AND 2 MG THE REST OF THE WEEK 90 tablet 1     "zoledronic acid (Reclast) 5 mg/100 mL piggyback Infuse 100 mL (5 mg) into a venous catheter 1 time.      zoledronic acid (Zometa) 4 mg/5 mL injection Infuse into a venous catheter.  for osteoporosis - given 3/29/21 yearly injection - [facility]       No current facility-administered medications on file prior to visit.              Objective   Visit Vitals  Resp 16   Wt 61.7 kg (136 lb)   BMI 18.98 kg/m²   Smoking Status Never   BSA 1.76 m²          4/11/2024    10:33 AM 4/18/2024    10:42 AM 4/24/2024    11:20 AM 5/1/2024    10:39 AM 5/20/2024    10:17 AM 6/3/2024     2:53 PM 7/8/2024    12:58 PM   Vitals   Systolic 118 108 113 102 116 111    Diastolic 62 64 70 63 76 66    Heart Rate 66 62 65 80 60 70    Temp    36.5 °C (97.7 °F) 36.7 °C (98 °F)     Resp 10 16 16 16 16 16 16   Height (in) 1.803 m (5' 11\") 1.803 m (5' 10.98\") 1.803 m (5' 10.98\")   1.803 m (5' 10.98\")    Weight (lb) 153 144 142  140 137 136   BMI 21.34 kg/m2 20.09 kg/m2 19.81 kg/m2  19.53 kg/m2 19.12 kg/m2 18.98 kg/m2   BSA (m2) 1.86 m2 1.81 m2 1.8 m2  1.78 m2 1.76 m2 1.76 m2   Visit Report Report Report Report Report Report Report Report     Physical Exam  Constitutional:       Appearance: Normal appearance.   HENT:      Head: Normocephalic and atraumatic.   Eyes:      Pupils: Pupils are equal, round, and reactive to light.   Cardiovascular:      Rate and Rhythm: Normal rate.   Pulmonary:      Effort: Pulmonary effort is normal.   Musculoskeletal:         General: No swelling.      Cervical back: Normal range of motion.   Skin:     Coloration: Skin is not jaundiced.   Neurological:      General: No focal deficit present.      Mental Status: He is alert and oriented to person, place, and time.           1. Encounter for annual wellness exam in Medicare patient   Patient is full code     Brian       Risk Factors Identified During Visit:   Influenza:    Pneumovax 23:uptodate  Prevnar:   Shingles Vaccine: recommended   Prostate cancer " screening: following with urology   Colorectal Cancer Screening: last done 2019 , reported that he does not need to repeat due to age.   Abdominal Aortic Aneurysm screening: never smoked     2. Benign prostatic hyperplasia with urinary obstruction  Hx of prostate cancer, following with urology Dr. Blackman.     3. Protein-calorie malnutrition, unspecified severity (Multi)  Doiong better, tolerating diet.     4. Moderate mixed hyperlipidemia not requiring statin therapy  Following with cardioloy     5. Mitral valve disorder  Following with cardiology     6. Spinal stenosis, unspecified spinal region  No acute complaints.     7. Raised prostate specific antigen      8. Malignant tumor of prostate (Multi)  Following with urology .

## 2024-07-08 NOTE — PATIENT INSTRUCTIONS
Skip today's warfarin.   1 mg on Monday Wednesday Friday and Sunday   2 mg on Tuesday and Thursday and Saturday

## 2024-07-15 ENCOUNTER — APPOINTMENT (OUTPATIENT)
Dept: PRIMARY CARE | Facility: CLINIC | Age: 84
End: 2024-07-15
Payer: MEDICARE

## 2024-07-15 VITALS
DIASTOLIC BLOOD PRESSURE: 50 MMHG | RESPIRATION RATE: 16 BRPM | HEART RATE: 58 BPM | TEMPERATURE: 97.6 F | SYSTOLIC BLOOD PRESSURE: 80 MMHG

## 2024-07-15 DIAGNOSIS — I48.91 ATRIAL FIBRILLATION, UNSPECIFIED TYPE (MULTI): ICD-10-CM

## 2024-07-15 DIAGNOSIS — G47.33 OSA (OBSTRUCTIVE SLEEP APNEA): ICD-10-CM

## 2024-07-15 DIAGNOSIS — R79.1 INR (INTERNATIONAL NORMAL RATIO) ABNORMAL: ICD-10-CM

## 2024-07-15 LAB
POC INR: 2.9
POC PROTHROMBIN TIME: NORMAL

## 2024-07-15 PROCEDURE — 99213 OFFICE O/P EST LOW 20 MIN: CPT | Performed by: FAMILY MEDICINE

## 2024-07-15 PROCEDURE — 3078F DIAST BP <80 MM HG: CPT | Performed by: FAMILY MEDICINE

## 2024-07-15 PROCEDURE — 1123F ACP DISCUSS/DSCN MKR DOCD: CPT | Performed by: FAMILY MEDICINE

## 2024-07-15 PROCEDURE — 3074F SYST BP LT 130 MM HG: CPT | Performed by: FAMILY MEDICINE

## 2024-07-15 PROCEDURE — 85610 PROTHROMBIN TIME: CPT | Performed by: FAMILY MEDICINE

## 2024-07-15 RX ORDER — WARFARIN 2 MG/1
TABLET ORAL
Qty: 90 TABLET | Refills: 1 | Status: SHIPPED | OUTPATIENT
Start: 2024-07-15

## 2024-07-15 NOTE — PROGRESS NOTES
Subjective   Patient ID: Gerry Celeste is a 83 y.o. male who presents for Follow-up.  HPI  1 mg on Monday Wednesday Friday and Sunday 2 mg on Tuesday and Thursday and Saturday       Review of Systems    Past Medical History:   Diagnosis Date    Essential hypertension, benign     Paroxysmal atrial fibrillation (Multi)        Past Surgical History:   Procedure Laterality Date    CARPAL TUNNEL RELEASE Bilateral     HERNIA REPAIR      REVERSE TOTAL SHOULDER ARTHROPLASTY Left       Social History     Socioeconomic History    Marital status: Unknown   Tobacco Use    Smoking status: Never    Smokeless tobacco: Never   Substance and Sexual Activity    Alcohol use: Not Currently    Drug use: Never      Family History   Problem Relation Name Age of Onset    Stroke Father         MEDICATIONS AND ALLERGIES:    ALLERGIES Patient has no known allergies.    MEDICATIONS   Current Outpatient Medications on File Prior to Visit   Medication Sig Dispense Refill    Alphagan P 0.1 % ophthalmic solution Administer 1 drop into both eyes. one hour after omnipred bid -      aspirin 81 mg chewable tablet Chew 1 tablet (81 mg) once daily.      cholecalciferol (Vitamin D-3) 5,000 Units tablet Take by mouth.  normally 10,000. he cut down to 5000 as of 5/20/22      dorzolamide-timoloL (Cosopt) 22.3-6.8 mg/mL ophthalmic solution       furosemide (Lasix) 20 mg tablet Take 1 tablet (20 mg) by mouth once daily as needed (instruct for swelling). per dr sumner 30 tablet 1    Lumigan 0.01 % ophthalmic solution Administer 1 drop into both eyes once daily at bedtime.      melatonin 5 mg capsule Take 1 capsule by mouth once daily at bedtime.      nitroglycerin (Nitrostat) 0.4 mg SL tablet Place 1 tablet (0.4 mg) under the tongue every 5 minutes if needed.  If no relief after 1 dose, call 911.      potassium chloride ER (Micro-K) 10 mEq ER capsule Take 1 capsule (10 mEq) by mouth once daily. when on the furosemide/lasix      risedronate (Actonel) 150 mg  "tablet Take 1 tablet (150 mg) by mouth every 30 (thirty) days.      simvastatin (Zocor) 10 mg tablet TAKE 1 TABLET BY MOUTH ONCE A DAY 90 tablet 3    sotalol (Betapace) 120 mg tablet Take 1 tablet (120 mg) by mouth every 12 hours. 9am and 9pm      tamsulosin (Flomax) 0.4 mg 24 hr capsule Take 1 capsule (0.4 mg) by mouth 2 times a day.      trospium (Sanctura) 20 mg tablet every 12 hours.      warfarin (Coumadin) 1 mg tablet Take 1mg on Tuesday and Thursday and 2 mg the rest of the week. 90 tablet 1    warfarin (Coumadin) 1 mg tablet 1 mg on Monday Wednesday Friday and Sunday THEN 2 mg on Tuesday and Thursday and Saturday 90 tablet 3    warfarin (Coumadin) 2 mg tablet 1MG ON TUESDAY AND THURSDAY AND 2 MG THE REST OF THE WEEK 90 tablet 1    zoledronic acid (Reclast) 5 mg/100 mL piggyback Infuse 100 mL (5 mg) into a venous catheter 1 time.      zoledronic acid (Zometa) 4 mg/5 mL injection Infuse into a venous catheter.  for osteoporosis - given 3/29/21 yearly injection - [facility]       No current facility-administered medications on file prior to visit.              Objective   Visit Vitals  BP 80/50 (BP Location: Left arm, Patient Position: Sitting, BP Cuff Size: Adult)   Pulse 58   Temp 36.4 °C (97.6 °F) (Temporal)   Resp 16   Smoking Status Never          4/18/2024    10:42 AM 4/24/2024    11:20 AM 5/1/2024    10:39 AM 5/20/2024    10:17 AM 6/3/2024     2:53 PM 7/8/2024    12:58 PM 7/15/2024     1:36 PM   Vitals   Systolic 108 113 102 116 111 100 80   Diastolic 64 70 63 76 66 60 50   Heart Rate 62 65 80 60 70 70 58   Temp   36.5 °C (97.7 °F) 36.7 °C (98 °F)  36.5 °C (97.7 °F) 36.4 °C (97.6 °F)   Resp 16 16 16 16 16 16 16   Height (in) 1.803 m (5' 10.98\") 1.803 m (5' 10.98\")   1.803 m (5' 10.98\")     Weight (lb) 144 142  140 137 136    BMI 20.09 kg/m2 19.81 kg/m2  19.53 kg/m2 19.12 kg/m2 18.98 kg/m2    BSA (m2) 1.81 m2 1.8 m2  1.78 m2 1.76 m2 1.76 m2    Visit Report Report Report Report Report Report " Report Report     Physical Exam  Constitutional: awake; alert, interactive; in no acute distress; well nourished and well developed  ENT: ears and nose were normal in appearance;  Eyes: pupils equal and round  Pulmonary: no respiratory distress and normal respiratory rhythm and effort  Skin: normal skin color and pigmentation;  Psychiatric: oriented to person, place, and time; affect was normal and the mood was normal         Assessment & Plan  INR (international normal ratio) abnormal  INR 2.9   Continue current dose   Recheck in 2 weeks   Goal to be betwean 2 and 3   1 mg on Monday Wednesday Friday and Sunday 2 mg on Tuesday and Thursday and Saturday   Orders:    Follow Up In Primary Care - Established    POCT INR manually resulted    Atrial fibrillation, unspecified type (Multi)    Orders:    Follow Up In Primary Care - Established    POCT INR manually resulted    warfarin (Coumadin) 2 mg tablet; 1 mg on Monday Wednesday Friday and Sunday 2 mg on Tuesday and Thursday and Saturday    WILMER (obstructive sleep apnea)    Orders:    Follow Up In Primary Care - Established    POCT INR manually resulted

## 2024-07-31 ENCOUNTER — APPOINTMENT (OUTPATIENT)
Dept: PRIMARY CARE | Facility: CLINIC | Age: 84
End: 2024-07-31
Payer: MEDICARE

## 2024-07-31 VITALS
RESPIRATION RATE: 16 BRPM | SYSTOLIC BLOOD PRESSURE: 113 MMHG | WEIGHT: 137 LBS | HEART RATE: 64 BPM | DIASTOLIC BLOOD PRESSURE: 72 MMHG | BODY MASS INDEX: 19.12 KG/M2 | TEMPERATURE: 97.7 F

## 2024-07-31 DIAGNOSIS — I48.91 ATRIAL FIBRILLATION, UNSPECIFIED TYPE (MULTI): Primary | ICD-10-CM

## 2024-07-31 DIAGNOSIS — Z79.01 LONG TERM (CURRENT) USE OF ANTICOAGULANTS: ICD-10-CM

## 2024-07-31 LAB
POC INR: 5.8
POC PROTHROMBIN TIME: NORMAL

## 2024-07-31 PROCEDURE — 1123F ACP DISCUSS/DSCN MKR DOCD: CPT | Performed by: NURSE PRACTITIONER

## 2024-07-31 PROCEDURE — 1160F RVW MEDS BY RX/DR IN RCRD: CPT | Performed by: NURSE PRACTITIONER

## 2024-07-31 PROCEDURE — 3078F DIAST BP <80 MM HG: CPT | Performed by: NURSE PRACTITIONER

## 2024-07-31 PROCEDURE — 1036F TOBACCO NON-USER: CPT | Performed by: NURSE PRACTITIONER

## 2024-07-31 PROCEDURE — 99213 OFFICE O/P EST LOW 20 MIN: CPT | Performed by: NURSE PRACTITIONER

## 2024-07-31 PROCEDURE — 3074F SYST BP LT 130 MM HG: CPT | Performed by: NURSE PRACTITIONER

## 2024-07-31 PROCEDURE — 85610 PROTHROMBIN TIME: CPT | Performed by: NURSE PRACTITIONER

## 2024-07-31 PROCEDURE — 1159F MED LIST DOCD IN RCRD: CPT | Performed by: NURSE PRACTITIONER

## 2024-07-31 ASSESSMENT — ENCOUNTER SYMPTOMS
NEUROLOGICAL NEGATIVE: 1
MUSCULOSKELETAL NEGATIVE: 1
GASTROINTESTINAL NEGATIVE: 1
ENDOCRINE NEGATIVE: 1
CONSTITUTIONAL NEGATIVE: 1
CARDIOVASCULAR NEGATIVE: 1
PSYCHIATRIC NEGATIVE: 1
RESPIRATORY NEGATIVE: 1

## 2024-07-31 NOTE — PROGRESS NOTES
Gerry Celeste male  83 y.o. for long term use of anticoagulation visit.    HPI     Gerry Celeste is a 83 y.o. here for an INR follow up exam.   Patient has had no chest pain, pressure, palpitations, shortness of breath, or bleeding.      Inr today is 5.8    Prior is 2.9 with pcp   Last pcp order was 1mg mwf rest days 2mg   Eating has been off per pt d/t his son came home and brought in food  And chocolate.   Will hold today and down to 1mg all days except 2mg on sat and sunday  H has been working outside alot/heat. Reviewed last labs and all normal.    Review of Systems   Constitutional: Negative.    HENT: Negative.     Respiratory: Negative.     Cardiovascular: Negative.    Gastrointestinal: Negative.    Endocrine: Negative.    Genitourinary: Negative.    Musculoskeletal: Negative.    Skin: Negative.    Neurological: Negative.    Psychiatric/Behavioral: Negative.         Physical Exam  Vitals and nursing note reviewed.   Constitutional:       Appearance: Normal appearance.   HENT:      Head: Normocephalic.   Eyes:      Pupils: Pupils are equal, round, and reactive to light.   Cardiovascular:      Rate and Rhythm: Normal rate and regular rhythm.      Heart sounds: Normal heart sounds.   Pulmonary:      Effort: Pulmonary effort is normal.      Breath sounds: Normal breath sounds.   Skin:     General: Skin is warm and dry.   Neurological:      General: No focal deficit present.      Mental Status: He is alert and oriented to person, place, and time. Mental status is at baseline.   Psychiatric:         Mood and Affect: Mood normal.         Behavior: Behavior normal.         Thought Content: Thought content normal.         Judgment: Judgment normal.          Visit Vitals  /72 (BP Location: Left arm, Patient Position: Sitting, BP Cuff Size: Small adult)   Pulse 64   Temp 36.5 °C (97.7 °F) (Temporal)   Resp 16      Vitals:    07/31/24 1350   Weight: 62.1 kg (137 lb)        Anticoagulation Episode Summary        Current INR goal:  --   TTR:  --   Next INR check:  --   INR from last check:  5.80 (7/31/2024)   Weekly max warfarin dose:  --   Target end date:  --   INR check location:  --   Preferred lab:  --   Send INR reminders to:  --       Comments:  --             Anticoagulation Monitoring INR INR Date Last Week Total Next Week Total Sun Mon Tufabiano Wed Thu   7/31/2024 5.80 7/31/2024 0 mg 0 mg - - - - -   7/15/2024 2.90 7/15/2024 0 mg 0 mg - - - - -   7/8/2024 4.10 7/8/2024 0 mg 0 mg - - - - -   6/3/2024 2.40 6/3/2024 0 mg 0 mg - - - - -   5/20/2024 3.60 5/20/2024 0 mg 0 mg - - - - -   5/1/2024 1.80 5/1/2024 0 mg 0 mg - - - - -   4/24/2024 1.60 4/24/2024 0 mg 0 mg - - - - -   4/18/2024 4.10 4/18/2024 0 mg 0 mg - - - - -   4/11/2024 4.60 4/11/2024 0 mg 0 mg - - - - -   3/28/2024 1.50 3/28/2024 0 mg 0 mg - - - - -   3/21/2024 3.20 3/21/2024 0 mg 0 mg - - - - -   3/14/2024 6.60 3/14/2024 0 mg 0 mg - - - - -   1/30/2024 2.10 1/30/2024 0 mg 0 mg - - - - -   1/4/2024 2.50 1/4/2024 0 mg 0 mg - - - - -   12/21/2023 2.90 12/21/2023 0 mg 0 mg - - - - -   12/11/2023 3.00 12/11/2023 0 mg 0 mg - - - - -   12/7/2023 3.70 12/7/2023 0 mg 0 mg - - - - -     Anticoagulation Monitoring Fri Sat Visit Report   7/31/2024 - - Report   7/15/2024 - - Report   7/8/2024 - - Report   6/3/2024 - - Report   5/20/2024 - - Report   5/1/2024 - - Report   4/24/2024 - - Report   4/18/2024 - - Report   4/11/2024 - - Report   3/28/2024 - - Report   3/21/2024 - - Report   3/14/2024 - - Report   1/30/2024 - - Report   1/4/2024 - - Report   12/21/2023 - - Report   12/11/2023 - - Report   12/7/2023 - - Report      Details                  Problem List Items Addressed This Visit    None  Visit Diagnoses         Codes    Atrial fibrillation, unspecified type (Multi)    -  Primary I48.91    Long term (current) use of anticoagulants     Z79.01         Laura L Seaver, MERLIN-CNP

## 2024-07-31 NOTE — PATIENT INSTRUCTIONS
Hold warfarin today  More greens this week    This week we will do 1mg all days but on sat/sun will be 2mg

## 2024-08-09 ENCOUNTER — APPOINTMENT (OUTPATIENT)
Dept: PRIMARY CARE | Facility: CLINIC | Age: 84
End: 2024-08-09
Payer: MEDICARE

## 2024-08-09 VITALS
WEIGHT: 137 LBS | DIASTOLIC BLOOD PRESSURE: 62 MMHG | TEMPERATURE: 97.3 F | RESPIRATION RATE: 16 BRPM | SYSTOLIC BLOOD PRESSURE: 98 MMHG | BODY MASS INDEX: 19.12 KG/M2 | HEART RATE: 56 BPM

## 2024-08-09 DIAGNOSIS — Z79.01 LONG TERM (CURRENT) USE OF ANTICOAGULANTS: ICD-10-CM

## 2024-08-09 DIAGNOSIS — I10 HYPERTENSION, UNSPECIFIED TYPE: ICD-10-CM

## 2024-08-09 DIAGNOSIS — D68.9 COAGULATION DEFECT (MULTI): ICD-10-CM

## 2024-08-09 DIAGNOSIS — I48.91 ATRIAL FIBRILLATION, UNSPECIFIED TYPE (MULTI): Primary | ICD-10-CM

## 2024-08-09 LAB
POC INR: 2
POC PROTHROMBIN TIME: NORMAL

## 2024-08-09 PROCEDURE — 1159F MED LIST DOCD IN RCRD: CPT | Performed by: NURSE PRACTITIONER

## 2024-08-09 PROCEDURE — 3074F SYST BP LT 130 MM HG: CPT | Performed by: NURSE PRACTITIONER

## 2024-08-09 PROCEDURE — 99213 OFFICE O/P EST LOW 20 MIN: CPT | Performed by: NURSE PRACTITIONER

## 2024-08-09 PROCEDURE — 85610 PROTHROMBIN TIME: CPT | Performed by: NURSE PRACTITIONER

## 2024-08-09 PROCEDURE — 1123F ACP DISCUSS/DSCN MKR DOCD: CPT | Performed by: NURSE PRACTITIONER

## 2024-08-09 PROCEDURE — 3078F DIAST BP <80 MM HG: CPT | Performed by: NURSE PRACTITIONER

## 2024-08-09 PROCEDURE — 1160F RVW MEDS BY RX/DR IN RCRD: CPT | Performed by: NURSE PRACTITIONER

## 2024-08-09 ASSESSMENT — ENCOUNTER SYMPTOMS
GASTROINTESTINAL NEGATIVE: 1
RESPIRATORY NEGATIVE: 1
CONSTITUTIONAL NEGATIVE: 1
ENDOCRINE NEGATIVE: 1
PSYCHIATRIC NEGATIVE: 1
CARDIOVASCULAR NEGATIVE: 1
NEUROLOGICAL NEGATIVE: 1
MUSCULOSKELETAL NEGATIVE: 1

## 2024-08-09 NOTE — PROGRESS NOTES
Gerry Celeste male  83 y.o. for long term use of anticoagulation visit.    HPI     Gerry Celeste is a 83 y.o. here for an INR follow up exam.   Patient has had no chest pain, pressure, palpitations, shortness of breath, or bleeding.      Last inr 5.8 (2.9, 4.1, 2.4, 3.6, 1.8)  He was unsure why, nothing new - last labs recent and normal  Last dosinmg on mwf, rest days 2mg  Held x 1 day last ov and restarted at 1mg all days except 2mg on sat/sun.  Has had no bleeding, no others/s.     Inr 2.0 today no changes   He will drink more water he does not drink coffee he reports.   No s/s not dizzy he says  B/p recheck 100/62 hr 66    Review of Systems   Constitutional: Negative.    HENT: Negative.     Respiratory: Negative.     Cardiovascular: Negative.    Gastrointestinal: Negative.    Endocrine: Negative.    Genitourinary: Negative.    Musculoskeletal: Negative.    Skin: Negative.    Neurological: Negative.    Psychiatric/Behavioral: Negative.         Physical Exam  Vitals and nursing note reviewed.   Constitutional:       Appearance: Normal appearance.   HENT:      Head: Normocephalic.   Eyes:      Pupils: Pupils are equal, round, and reactive to light.   Cardiovascular:      Rate and Rhythm: Normal rate and regular rhythm.      Heart sounds: Normal heart sounds.   Pulmonary:      Effort: Pulmonary effort is normal.      Breath sounds: Normal breath sounds.   Skin:     General: Skin is warm and dry.   Neurological:      General: No focal deficit present.      Mental Status: He is alert and oriented to person, place, and time. Mental status is at baseline.   Psychiatric:         Mood and Affect: Mood normal.         Behavior: Behavior normal.         Thought Content: Thought content normal.         Judgment: Judgment normal.          Visit Vitals  BP 98/62 (BP Location: Left arm, Patient Position: Sitting, BP Cuff Size: Small adult)   Pulse 56   Temp 36.3 °C (97.3 °F) (Temporal)   Resp 16      Vitals:    24  1355   Weight: 62.1 kg (137 lb)        Anticoagulation Episode Summary       Current INR goal:  --   TTR:  --   Next INR check:  --   INR from last check:  2.00 (8/9/2024)   Weekly max warfarin dose:  --   Target end date:  --   INR check location:  --   Preferred lab:  --   Send INR reminders to:  --       Comments:  --             Anticoagulation Monitoring INR INR Date Last Week Total Next Week Total Sun Mon Tue Wed Thu   8/9/2024 2.00 8/9/2024 0 mg 0 mg - - - - -   7/31/2024 5.80 7/31/2024 0 mg 0 mg - - - - -   7/15/2024 2.90 7/15/2024 0 mg 0 mg - - - - -   7/8/2024 4.10 7/8/2024 0 mg 0 mg - - - - -   6/3/2024 2.40 6/3/2024 0 mg 0 mg - - - - -   5/20/2024 3.60 5/20/2024 0 mg 0 mg - - - - -   5/1/2024 1.80 5/1/2024 0 mg 0 mg - - - - -   4/24/2024 1.60 4/24/2024 0 mg 0 mg - - - - -   4/18/2024 4.10 4/18/2024 0 mg 0 mg - - - - -   4/11/2024 4.60 4/11/2024 0 mg 0 mg - - - - -   3/28/2024 1.50 3/28/2024 0 mg 0 mg - - - - -   3/21/2024 3.20 3/21/2024 0 mg 0 mg - - - - -   3/14/2024 6.60 3/14/2024 0 mg 0 mg - - - - -   1/30/2024 2.10 1/30/2024 0 mg 0 mg - - - - -   1/4/2024 2.50 1/4/2024 0 mg 0 mg - - - - -   12/21/2023 2.90 12/21/2023 0 mg 0 mg - - - - -   12/11/2023 3.00 12/11/2023 0 mg 0 mg - - - - -   12/7/2023 3.70 12/7/2023 0 mg 0 mg - - - - -     Anticoagulation Monitoring Fri Sat Visit Report   8/9/2024 - - Report   7/31/2024 - - Report   7/15/2024 - - Report   7/8/2024 - - Report   6/3/2024 - - Report   5/20/2024 - - Report   5/1/2024 - - Report   4/24/2024 - - Report   4/18/2024 - - Report   4/11/2024 - - Report   3/28/2024 - - Report   3/21/2024 - - Report   3/14/2024 - - Report   1/30/2024 - - Report   1/4/2024 - - Report   12/21/2023 - - Report   12/11/2023 - - Report   12/7/2023 - - Report      Details                  Problem List Items Addressed This Visit    None  Visit Diagnoses         Codes    Atrial fibrillation, unspecified type (Multi)    -  Primary I48.91    Relevant Orders    POCT INR manually  resulted (Completed)    Coagulation defect (Multi)     D68.9    Relevant Orders    POCT INR manually resulted (Completed)    Hypertension, unspecified type     I10    Relevant Orders    POCT INR manually resulted (Completed)    Long term (current) use of anticoagulants     Z79.01    Relevant Orders    POCT INR manually resulted (Completed)              Laura L Seaver, APRN-CNP

## 2024-08-15 ENCOUNTER — DOCUMENTATION (OUTPATIENT)
Dept: PHARMACY | Facility: HOSPITAL | Age: 84
End: 2024-08-15

## 2024-08-15 ENCOUNTER — APPOINTMENT (OUTPATIENT)
Dept: PRIMARY CARE | Facility: CLINIC | Age: 84
End: 2024-08-15
Payer: MEDICARE

## 2024-08-15 VITALS
SYSTOLIC BLOOD PRESSURE: 110 MMHG | WEIGHT: 134 LBS | HEIGHT: 71 IN | DIASTOLIC BLOOD PRESSURE: 60 MMHG | HEART RATE: 70 BPM | BODY MASS INDEX: 18.76 KG/M2

## 2024-08-15 DIAGNOSIS — I10 HYPERTENSION, UNSPECIFIED TYPE: ICD-10-CM

## 2024-08-15 DIAGNOSIS — Z79.01 LONG TERM (CURRENT) USE OF ANTICOAGULANTS: ICD-10-CM

## 2024-08-15 DIAGNOSIS — D68.9 COAGULATION DEFECT (MULTI): ICD-10-CM

## 2024-08-15 DIAGNOSIS — I48.91 ATRIAL FIBRILLATION, UNSPECIFIED TYPE (MULTI): Primary | ICD-10-CM

## 2024-08-15 PROCEDURE — 1123F ACP DISCUSS/DSCN MKR DOCD: CPT | Performed by: NURSE PRACTITIONER

## 2024-08-15 PROCEDURE — 99213 OFFICE O/P EST LOW 20 MIN: CPT | Performed by: NURSE PRACTITIONER

## 2024-08-15 PROCEDURE — 3074F SYST BP LT 130 MM HG: CPT | Performed by: NURSE PRACTITIONER

## 2024-08-15 PROCEDURE — 3078F DIAST BP <80 MM HG: CPT | Performed by: NURSE PRACTITIONER

## 2024-08-15 ASSESSMENT — ENCOUNTER SYMPTOMS
PSYCHIATRIC NEGATIVE: 1
MUSCULOSKELETAL NEGATIVE: 1
NEUROLOGICAL NEGATIVE: 1
GASTROINTESTINAL NEGATIVE: 1
CARDIOVASCULAR NEGATIVE: 1
ENDOCRINE NEGATIVE: 1
CONSTITUTIONAL NEGATIVE: 1
RESPIRATORY NEGATIVE: 1

## 2024-08-15 NOTE — PATIENT INSTRUCTIONS
Inr 1.9  Now warfarin dose - 2mg on ALL FRIDAYS/SATURDAYS/SUNDAYS rest day 1mg     See you in one week!

## 2024-08-15 NOTE — PROGRESS NOTES
Based on Gerry Celeste 's recent medical history, it looks like Gerry Celeste could be eligible to switch from warfarin to a direct oral anticoagulant (DOAC).    To facilitate this transition smoothly, the [facility] Clinical Pharmacy team is available to assist with the conversion process. The pharmacists can provide support with medication selection, dosing adjustments, financial aid, and patient education to ensure a seamless switch from warfarin to a DOAC.    Warfarin Managing Provider: Dr. Covington  Managing Provider Specialty: Primary Care    If the the managing provider is interested in pursuing this option, please see instructions below:     Review patient's chart and ensure patient does not have any contraindications to DOACs. The pharmacy team has already done an initial review; however, we encourage a second opinion from the managing provider.   It is strongly recommended that the office staff inform the patient that a referral will be made to the clinical pharmacy team to manage their anticoagulation therapy change. This warm hand off significantly increases the chances of the patient scheduling and working with the pharmacist.   Enter referral to clinical pharmacy as indicated below:   Add Order: Referral to Clinical Pharmacy   Service: NON-APC  Reason for referral: Warfarin conversion - if you prefer a specific DOAC, please indicate that here  The patient will be contacted to set up a telemedicine visit with one of our clinical pharmacists to begin conversion process.   Once the pharmacist meets with the patient, the referring provider will receive ongoing updates.    If the managing provider has any questions, please feel free to reach out.     Tomeka Verdin, PharmD

## 2024-08-15 NOTE — PROGRESS NOTES
Gerry Celeste male  83 y.o. for long term use of anticoagulation visit.    HPI     Gerry Celeste is a 83 y.o. here for an INR follow up exam.   Patient has had no chest pain, pressure, palpitations, shortness of breath, or bleeding.      Last inr 2.0 (5.8, 2.9, 4.10)   No changes made   Warfarin dose 1mg all days but 2mg on sat/sun  Inr 1.9    Some coleslaw'will go to 2mg on FRI/sat and sun rest days 1mg   Follow up one week     Review of Systems   Constitutional: Negative.    HENT: Negative.     Respiratory: Negative.     Cardiovascular: Negative.    Gastrointestinal: Negative.    Endocrine: Negative.    Genitourinary: Negative.    Musculoskeletal: Negative.    Skin: Negative.    Neurological: Negative.    Psychiatric/Behavioral: Negative.         Physical Exam  Vitals and nursing note reviewed.   Constitutional:       General: He is not in acute distress.     Appearance: Normal appearance.   HENT:      Head: Normocephalic and atraumatic.   Eyes:      Extraocular Movements: Extraocular movements intact.      Pupils: Pupils are equal, round, and reactive to light.   Cardiovascular:      Rate and Rhythm: Normal rate and regular rhythm.   Pulmonary:      Effort: Pulmonary effort is normal.      Breath sounds: Normal breath sounds.   Abdominal:      Palpations: Abdomen is soft.   Skin:     General: Skin is warm and dry.   Neurological:      General: No focal deficit present.      Mental Status: He is alert and oriented to person, place, and time.   Psychiatric:         Mood and Affect: Mood normal.         Behavior: Behavior normal.          Visit Vitals  /60   Pulse 70      Vitals:    08/15/24 1408   Weight: 60.8 kg (134 lb)        Anticoagulation Episode Summary       Current INR goal:  --   TTR:  --   Next INR check:  --   INR from last check:  2.00 (8/9/2024)   Weekly max warfarin dose:  --   Target end date:  --   INR check location:  --   Preferred lab:  --   Send INR reminders to:  --       Comments:  --              Anticoagulation Monitoring INR INR Date Last Week Total Next Week Total Sun [place] [place] Thu   8/9/2024 2.00 8/9/2024 0 mg 0 mg - - - - -   7/31/2024 5.80 7/31/2024 0 mg 0 mg - - - - -   7/15/2024 2.90 7/15/2024 0 mg 0 mg - - - - -   7/8/2024 4.10 7/8/2024 0 mg 0 mg - - - - -   6/3/2024 2.40 6/3/2024 0 mg 0 mg - - - - -   5/20/2024 3.60 5/20/2024 0 mg 0 mg - - - - -   5/1/2024 1.80 5/1/2024 0 mg 0 mg - - - - -   4/24/2024 1.60 4/24/2024 0 mg 0 mg - - - - -   4/18/2024 4.10 4/18/2024 0 mg 0 mg - - - - -   4/11/2024 4.60 4/11/2024 0 mg 0 mg - - - - -   3/28/2024 1.50 3/28/2024 0 mg 0 mg - - - - -   3/21/2024 3.20 3/21/2024 0 mg 0 mg - - - - -   3/14/2024 6.60 3/14/2024 0 mg 0 mg - - - - -   1/30/2024 2.10 1/30/2024 0 mg 0 mg - - - - -   1/4/2024 2.50 1/4/2024 0 mg 0 mg - - - - -   12/21/2023 2.90 12/21/2023 0 mg 0 mg - - - - -   12/11/2023 3.00 12/11/2023 0 mg 0 mg - - - - -   12/7/2023 3.70 12/7/2023 0 mg 0 mg - - - - -     Anticoagulation Monitoring Fri Sat Visit Report   8/9/2024 - - Report   7/31/2024 - - Report   7/15/2024 - - Report   7/8/2024 - - Report   6/3/2024 - - Report   5/20/2024 - - Report   5/1/2024 - - Report   4/24/2024 - - Report   4/18/2024 - - Report   4/11/2024 - - Report   3/28/2024 - - Report   3/21/2024 - - Report   3/14/2024 - - Report   1/30/2024 - - Report   1/4/2024 - - Report   12/21/2023 - - Report   12/11/2023 - - Report   12/7/2023 - - Report      Details                  Problem List Items Addressed This Visit    None  Visit Diagnoses         Codes    Atrial fibrillation, unspecified type (Multi)    -  Primary I48.91    Coagulation defect (Multi)     D68.9    Hypertension, unspecified type     I10    Long term (current) use of anticoagulants     Z79.01              Laura L Seaver, APRN-CNP

## 2024-08-19 DIAGNOSIS — R60.0 LOCALIZED EDEMA: ICD-10-CM

## 2024-08-20 RX ORDER — FUROSEMIDE 20 MG/1
20 TABLET ORAL DAILY PRN
Qty: 30 TABLET | Refills: 1 | Status: SHIPPED | OUTPATIENT
Start: 2024-08-20 | End: 2024-08-22 | Stop reason: SDUPTHER

## 2024-08-21 NOTE — PROGRESS NOTES
Gerry Celeste male  83 y.o. for long term use of anticoagulation visit.    HPI     Gerry Celeste is a 83 y.o. here for an INR follow up exam.   Patient has had no chest pain, pressure, palpitations, shortness of breath, or bleeding.      Last inr 2 he has been labile (5.8, 2.9, 4.10, 2.4, 3.6) he says kids visits/food changes  Last warfarin dosing 1mg all days but 2mg on fri/sat/sun  We will go to 1mg all days except 2mg on all thur/fri/sat/sun    Dr sumner for her cardiology care  Inr last 2.0  Today is 2.0  No changes follow up 3 weeks       Last wt 134  - lb 140 some swelling no other s/s   Will take one of his lasix today only x1 - he has PRN per dr sumner         Review of Systems   Constitutional: Negative.    HENT: Negative.     Respiratory: Negative.     Cardiovascular: Negative.    Gastrointestinal: Negative.    Endocrine: Negative.    Genitourinary: Negative.    Musculoskeletal: Negative.    Skin: Negative.    Neurological: Negative.    Psychiatric/Behavioral: Negative.         Physical Exam  Vitals and nursing note reviewed.   Constitutional:       Appearance: Normal appearance.   HENT:      Head: Normocephalic.   Eyes:      Pupils: Pupils are equal, round, and reactive to light.   Cardiovascular:      Rate and Rhythm: Normal rate and regular rhythm.      Heart sounds: Normal heart sounds.   Pulmonary:      Effort: Pulmonary effort is normal.      Breath sounds: Normal breath sounds.   Skin:     General: Skin is warm and dry.   Neurological:      General: No focal deficit present.      Mental Status: He is alert and oriented to person, place, and time. Mental status is at baseline.   Psychiatric:         Mood and Affect: Mood normal.         Behavior: Behavior normal.         Thought Content: Thought content normal.         Judgment: Judgment normal.          Visit Vitals  /62   Pulse 64   Resp 10      Vitals:    08/22/24 1400   Weight: 63.5 kg (140 lb)        Anticoagulation Episode Summary        Current INR goal:  --   TTR:  --   Next INR check:  --   INR from last check:  2.00 (8/22/2024)   Weekly max warfarin dose:  --   Target end date:  --   INR check location:  --   Preferred lab:  --   Send INR reminders to:  --       Comments:  --             Anticoagulation Monitoring [facility]R IN[facility] Date Last Week Total Next [facility]   8/22/2024 2.00 8/22/2024 0 mg 0 mg - - - - -   8/9/2024 2.00 8/9/2024 0 mg 0 mg - - - - -   7/31/2024 5.80 7/31/2024 0 mg 0 mg - - - - -   7/15/2024 2.90 7/15/2024 0 mg 0 mg - - - - -   7/8/2024 4.10 7/8/2024 0 mg 0 mg - - - - -   6/3/2024 2.40 6/3/2024 0 mg 0 mg - - - - -   5/20/2024 3.60 5/20/2024 0 mg 0 mg - - - - -   5/1/2024 1.80 5/1/2024 0 mg 0 mg - - - - -   4/24/2024 1.60 4/24/2024 0 mg 0 mg - - - - -   4/18/2024 4.10 4/18/2024 0 mg 0 mg - - - - -   4/11/2024 4.60 4/11/2024 0 mg 0 mg - - - - -   3/28/2024 1.50 3/28/2024 0 mg 0 mg - - - - -   3/21/2024 3.20 3/21/2024 0 mg 0 mg - - - - -   3/14/2024 6.60 3/14/2024 0 mg 0 mg - - - - -   1/30/2024 2.10 1/30/2024 0 mg 0 mg - - - - -   1/4/2024 2.50 1/4/2024 0 mg 0 mg - - - - -   12/21/2023 2.90 12/21/2023 0 mg 0 mg - - - - -   12/11/2023 3.00 12/11/2023 0 mg 0 mg - - - - -   12/7/2023 3.70 12/7/2023 0 mg 0 mg - - - - -     Anticoagulation Monitoring Fri Sat Visit Report   8/22/2024 - - Report   8/9/2024 - - Report   7/31/2024 - - Report   7/15/2024 - - Report   7/8/2024 - - Report   6/3/2024 - - Report   5/20/2024 - - Report   5/1/2024 - - Report   4/24/2024 - - Report   4/18/2024 - - Report   4/11/2024 - - Report   3/28/2024 - - Report   3/21/2024 - - Report   3/14/2024 - - Report   1/30/2024 - - Report   1/4/2024 - - Report   12/21/2023 - - Report   12/11/2023 - - Report   12/7/2023 - - Report      Details                  Problem List Items Addressed This Visit             ICD-10-CM    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.1, N13.8    Relevant Medications    warfarin (Coumadin) 1 mg tablet     Other Relevant Orders    POCT INR manually resulted (Completed)    Hyperlipidemia E78.5    Relevant Medications    warfarin (Coumadin) 1 mg tablet    Other Relevant Orders    POCT INR manually resulted (Completed)    Malignant tumor of prostate (Multi) C61    Relevant Medications    warfarin (Coumadin) 1 mg tablet    Other Relevant Orders    POCT INR manually resulted (Completed)    Mitral valve disorder I05.9    Relevant Medications    warfarin (Coumadin) 1 mg tablet    Other Relevant Orders    POCT INR manually resulted (Completed)    Raised prostate specific antigen R97.20    Relevant Medications    warfarin (Coumadin) 1 mg tablet    Other Relevant Orders    POCT INR manually resulted (Completed)    Spinal stenosis M48.00    Relevant Medications    warfarin (Coumadin) 1 mg tablet    Other Relevant Orders    POCT INR manually resulted (Completed)    Protein-calorie malnutrition, unspecified severity (Multi) E46    Relevant Medications    warfarin (Coumadin) 1 mg tablet    Other Relevant Orders    POCT INR manually resulted (Completed)     Other Visit Diagnoses         Codes    Atrial fibrillation, unspecified type (Multi)    -  Primary I48.91    Relevant Medications    furosemide (Lasix) 20 mg tablet    warfarin (Coumadin) 2 mg tablet    warfarin (Coumadin) 1 mg tablet    Other Relevant Orders    POCT INR manually resulted (Completed)    Coagulation defect (Multi)     D68.9    Relevant Medications    warfarin (Coumadin) 2 mg tablet    warfarin (Coumadin) 1 mg tablet    Other Relevant Orders    POCT INR manually resulted (Completed)    Hypertension, unspecified type     I10    Relevant Orders    POCT INR manually resulted (Completed)    Long term (current) use of anticoagulants     Z79.01    Relevant Orders    POCT INR manually resulted (Completed)    Localized edema     R60.0    Relevant Medications    furosemide (Lasix) 20 mg tablet    Other Relevant Orders    POCT INR manually resulted (Completed)    Encounter  for annual wellness exam in Medicare patient     Z00.00    Relevant Medications    warfarin (Coumadin) 1 mg tablet    Other Relevant Orders    POCT INR manually resulted (Completed)    INR (international normal ratio) abnormal     R79.1    Relevant Medications    warfarin (Coumadin) 1 mg tablet    Other Relevant Orders    POCT INR manually resulted (Completed)    WILMER (obstructive sleep apnea)     G47.33    Relevant Medications    warfarin (Coumadin) 1 mg tablet    Other Relevant Orders    POCT INR manually resulted (Completed)              Laura L Seaver, APRN-CNP

## 2024-08-22 ENCOUNTER — APPOINTMENT (OUTPATIENT)
Dept: PRIMARY CARE | Facility: CLINIC | Age: 84
End: 2024-08-22
Payer: MEDICARE

## 2024-08-22 VITALS
BODY MASS INDEX: 19.6 KG/M2 | HEART RATE: 64 BPM | DIASTOLIC BLOOD PRESSURE: 62 MMHG | HEIGHT: 71 IN | RESPIRATION RATE: 10 BRPM | WEIGHT: 140 LBS | SYSTOLIC BLOOD PRESSURE: 118 MMHG

## 2024-08-22 DIAGNOSIS — M48.00 SPINAL STENOSIS, UNSPECIFIED SPINAL REGION: ICD-10-CM

## 2024-08-22 DIAGNOSIS — N40.1 BENIGN PROSTATIC HYPERPLASIA WITH URINARY OBSTRUCTION: ICD-10-CM

## 2024-08-22 DIAGNOSIS — N13.8 BENIGN PROSTATIC HYPERPLASIA WITH URINARY OBSTRUCTION: ICD-10-CM

## 2024-08-22 DIAGNOSIS — R79.1 INR (INTERNATIONAL NORMAL RATIO) ABNORMAL: ICD-10-CM

## 2024-08-22 DIAGNOSIS — Z00.00 ENCOUNTER FOR ANNUAL WELLNESS EXAM IN MEDICARE PATIENT: ICD-10-CM

## 2024-08-22 DIAGNOSIS — I48.91 ATRIAL FIBRILLATION, UNSPECIFIED TYPE (MULTI): Primary | ICD-10-CM

## 2024-08-22 DIAGNOSIS — E46 PROTEIN-CALORIE MALNUTRITION, UNSPECIFIED SEVERITY (MULTI): ICD-10-CM

## 2024-08-22 DIAGNOSIS — I10 HYPERTENSION, UNSPECIFIED TYPE: ICD-10-CM

## 2024-08-22 DIAGNOSIS — C61 MALIGNANT TUMOR OF PROSTATE (MULTI): ICD-10-CM

## 2024-08-22 DIAGNOSIS — Z79.01 LONG TERM (CURRENT) USE OF ANTICOAGULANTS: ICD-10-CM

## 2024-08-22 DIAGNOSIS — R60.0 LOCALIZED EDEMA: ICD-10-CM

## 2024-08-22 DIAGNOSIS — E78.2 MODERATE MIXED HYPERLIPIDEMIA NOT REQUIRING STATIN THERAPY: ICD-10-CM

## 2024-08-22 DIAGNOSIS — R97.20 RAISED PROSTATE SPECIFIC ANTIGEN: ICD-10-CM

## 2024-08-22 DIAGNOSIS — I05.9 MITRAL VALVE DISORDER: ICD-10-CM

## 2024-08-22 DIAGNOSIS — G47.33 OSA (OBSTRUCTIVE SLEEP APNEA): ICD-10-CM

## 2024-08-22 DIAGNOSIS — D68.9 COAGULATION DEFECT (MULTI): ICD-10-CM

## 2024-08-22 LAB
POC INR: 2
POC PROTHROMBIN TIME: NORMAL

## 2024-08-22 PROCEDURE — 99213 OFFICE O/P EST LOW 20 MIN: CPT | Performed by: NURSE PRACTITIONER

## 2024-08-22 PROCEDURE — 1036F TOBACCO NON-USER: CPT | Performed by: NURSE PRACTITIONER

## 2024-08-22 PROCEDURE — 1159F MED LIST DOCD IN RCRD: CPT | Performed by: NURSE PRACTITIONER

## 2024-08-22 PROCEDURE — 3074F SYST BP LT 130 MM HG: CPT | Performed by: NURSE PRACTITIONER

## 2024-08-22 PROCEDURE — 1123F ACP DISCUSS/DSCN MKR DOCD: CPT | Performed by: NURSE PRACTITIONER

## 2024-08-22 PROCEDURE — 3078F DIAST BP <80 MM HG: CPT | Performed by: NURSE PRACTITIONER

## 2024-08-22 PROCEDURE — 1160F RVW MEDS BY RX/DR IN RCRD: CPT | Performed by: NURSE PRACTITIONER

## 2024-08-22 PROCEDURE — 85610 PROTHROMBIN TIME: CPT | Performed by: NURSE PRACTITIONER

## 2024-08-22 RX ORDER — WARFARIN 2 MG/1
TABLET ORAL
Qty: 90 TABLET | Refills: 1 | Status: SHIPPED
Start: 2024-08-22 | End: 2024-08-22 | Stop reason: DRUGHIGH

## 2024-08-22 RX ORDER — WARFARIN 1 MG/1
TABLET ORAL
Qty: 90 TABLET | Refills: 1 | Status: SHIPPED
Start: 2024-08-22 | End: 2024-08-22 | Stop reason: WASHOUT

## 2024-08-22 RX ORDER — FUROSEMIDE 20 MG/1
20 TABLET ORAL DAILY PRN
Qty: 90 TABLET | Refills: 1 | Status: SHIPPED | OUTPATIENT
Start: 2024-08-22

## 2024-08-22 RX ORDER — WARFARIN 2 MG/1
TABLET ORAL
Qty: 90 TABLET | Refills: 1 | Status: SHIPPED | OUTPATIENT
Start: 2024-08-22

## 2024-08-22 RX ORDER — WARFARIN 1 MG/1
TABLET ORAL
Qty: 90 TABLET | Refills: 1 | Status: SHIPPED | OUTPATIENT
Start: 2024-08-22 | End: 2025-08-22

## 2024-08-22 ASSESSMENT — ENCOUNTER SYMPTOMS
NEUROLOGICAL NEGATIVE: 1
PSYCHIATRIC NEGATIVE: 1
ENDOCRINE NEGATIVE: 1
CARDIOVASCULAR NEGATIVE: 1
MUSCULOSKELETAL NEGATIVE: 1
CONSTITUTIONAL NEGATIVE: 1
GASTROINTESTINAL NEGATIVE: 1
RESPIRATORY NEGATIVE: 1

## 2024-09-12 ENCOUNTER — APPOINTMENT (OUTPATIENT)
Dept: PRIMARY CARE | Facility: CLINIC | Age: 84
End: 2024-09-12
Payer: MEDICARE

## 2024-09-12 VITALS — WEIGHT: 132.2 LBS | DIASTOLIC BLOOD PRESSURE: 80 MMHG | SYSTOLIC BLOOD PRESSURE: 130 MMHG | BODY MASS INDEX: 18.44 KG/M2

## 2024-09-12 DIAGNOSIS — E78.2 MODERATE MIXED HYPERLIPIDEMIA NOT REQUIRING STATIN THERAPY: ICD-10-CM

## 2024-09-12 DIAGNOSIS — N13.8 BENIGN PROSTATIC HYPERPLASIA WITH URINARY OBSTRUCTION: ICD-10-CM

## 2024-09-12 DIAGNOSIS — I05.9 MITRAL VALVE DISORDER: ICD-10-CM

## 2024-09-12 DIAGNOSIS — C61 MALIGNANT TUMOR OF PROSTATE (MULTI): ICD-10-CM

## 2024-09-12 DIAGNOSIS — R97.20 RAISED PROSTATE SPECIFIC ANTIGEN: ICD-10-CM

## 2024-09-12 DIAGNOSIS — Z79.01 LONG TERM (CURRENT) USE OF ANTICOAGULANTS: ICD-10-CM

## 2024-09-12 DIAGNOSIS — I10 HYPERTENSION, UNSPECIFIED TYPE: ICD-10-CM

## 2024-09-12 DIAGNOSIS — N40.1 BENIGN PROSTATIC HYPERPLASIA WITH URINARY OBSTRUCTION: ICD-10-CM

## 2024-09-12 DIAGNOSIS — R79.1 INR (INTERNATIONAL NORMAL RATIO) ABNORMAL: ICD-10-CM

## 2024-09-12 DIAGNOSIS — G47.33 OSA (OBSTRUCTIVE SLEEP APNEA): ICD-10-CM

## 2024-09-12 DIAGNOSIS — E46 PROTEIN-CALORIE MALNUTRITION, UNSPECIFIED SEVERITY (MULTI): ICD-10-CM

## 2024-09-12 DIAGNOSIS — Z00.00 ENCOUNTER FOR ANNUAL WELLNESS EXAM IN MEDICARE PATIENT: ICD-10-CM

## 2024-09-12 DIAGNOSIS — I48.91 ATRIAL FIBRILLATION, UNSPECIFIED TYPE (MULTI): Primary | ICD-10-CM

## 2024-09-12 DIAGNOSIS — M48.00 SPINAL STENOSIS, UNSPECIFIED SPINAL REGION: ICD-10-CM

## 2024-09-12 DIAGNOSIS — D68.9 COAGULATION DEFECT (MULTI): ICD-10-CM

## 2024-09-12 PROCEDURE — 1159F MED LIST DOCD IN RCRD: CPT | Performed by: NURSE PRACTITIONER

## 2024-09-12 PROCEDURE — 1160F RVW MEDS BY RX/DR IN RCRD: CPT | Performed by: NURSE PRACTITIONER

## 2024-09-12 PROCEDURE — 3079F DIAST BP 80-89 MM HG: CPT | Performed by: NURSE PRACTITIONER

## 2024-09-12 PROCEDURE — 1123F ACP DISCUSS/DSCN MKR DOCD: CPT | Performed by: NURSE PRACTITIONER

## 2024-09-12 PROCEDURE — 99213 OFFICE O/P EST LOW 20 MIN: CPT | Performed by: NURSE PRACTITIONER

## 2024-09-12 PROCEDURE — 3075F SYST BP GE 130 - 139MM HG: CPT | Performed by: NURSE PRACTITIONER

## 2024-09-12 RX ORDER — WARFARIN 1 MG/1
TABLET ORAL
Qty: 90 TABLET | Refills: 1 | Status: SHIPPED | OUTPATIENT
Start: 2024-09-12 | End: 2025-09-12

## 2024-09-12 RX ORDER — WARFARIN 2 MG/1
TABLET ORAL
Qty: 90 TABLET | Refills: 1 | Status: SHIPPED | OUTPATIENT
Start: 2024-09-12

## 2024-09-12 ASSESSMENT — ENCOUNTER SYMPTOMS
NEUROLOGICAL NEGATIVE: 1
CARDIOVASCULAR NEGATIVE: 1
RESPIRATORY NEGATIVE: 1
ENDOCRINE NEGATIVE: 1
GASTROINTESTINAL NEGATIVE: 1
CONSTITUTIONAL NEGATIVE: 1
MUSCULOSKELETAL NEGATIVE: 1
PSYCHIATRIC NEGATIVE: 1

## 2024-09-12 NOTE — PROGRESS NOTES
Gerry Celeste male  83 y.o. for long term use of anticoagulation visit.    HPI     Gerry Celeste is a 83 y.o. here for an INR follow up exam.   Patient has had no chest pain, pressure, palpitations, shortness of breath, or bleeding.     Saw dr sumner for his afib and rheum for his raynauds as well as   His urologist for hx of prostate ca he is all utd    Last inr 2.o (2.0,. 2.0, 5.8, 2.9, 4.1)     Warfarin 1mg daily but on thur/fri/sat/sun  Prior 2mg fri/sat/sun rest days 1mg - lets try spreading out the 2mg so now 2mg on MWF rest days 1mg - as inr is 3.2 today       Review of Systems   Constitutional: Negative.    HENT: Negative.     Respiratory: Negative.     Cardiovascular: Negative.    Gastrointestinal: Negative.    Endocrine: Negative.    Genitourinary: Negative.    Musculoskeletal: Negative.    Skin: Negative.    Neurological: Negative.    Psychiatric/Behavioral: Negative.         Physical Exam  Vitals and nursing note reviewed.   Constitutional:       Appearance: Normal appearance.   HENT:      Head: Normocephalic.   Eyes:      Pupils: Pupils are equal, round, and reactive to light.   Cardiovascular:      Rate and Rhythm: Normal rate and regular rhythm.      Heart sounds: Normal heart sounds.   Pulmonary:      Effort: Pulmonary effort is normal.      Breath sounds: Normal breath sounds.   Skin:     General: Skin is warm and dry.   Neurological:      General: No focal deficit present.      Mental Status: He is alert and oriented to person, place, and time. Mental status is at baseline.   Psychiatric:         Mood and Affect: Mood normal.         Behavior: Behavior normal.         Thought Content: Thought content normal.         Judgment: Judgment normal.          Visit Vitals  /80 (BP Location: Left arm, Patient Position: Sitting, BP Cuff Size: Adult)      Vitals:    09/12/24 0950   Weight: 60 kg (132 lb 3.2 oz)        Anticoagulation Episode Summary       Current INR goal:  --   TTR:  --   Next INR  check:  --   INR from last check:  2.00 (8/22/2024)   Weekly max warfarin dose:  --   Target end date:  --   INR check location:  --   Preferred lab:  --   Send INR reminders to:  --       Comments:  --             Anticoagulation Monitoring INR INR Date Last Week Total Next Week Total Sun [facility] [place] Thu   8/22/2024 2.00 8/22/2024 0 mg 0 mg - - - - -   8/9/2024 2.00 8/9/2024 0 mg 0 mg - - - - -   7/31/2024 5.80 7/31/2024 0 mg 0 mg - - - - -   7/15/2024 2.90 7/15/2024 0 mg 0 mg - - - - -   7/8/2024 4.10 7/8/2024 0 mg 0 mg - - - - -   6/3/2024 2.40 6/3/2024 0 mg 0 mg - - - - -   5/20/2024 3.60 5/20/2024 0 mg 0 mg - - - - -   5/1/2024 1.80 5/1/2024 0 mg 0 mg - - - - -   4/24/2024 1.60 4/24/2024 0 mg 0 mg - - - - -   4/18/2024 4.10 4/18/2024 0 mg 0 mg - - - - -   4/11/2024 4.60 4/11/2024 0 mg 0 mg - - - - -   3/28/2024 1.50 3/28/2024 0 mg 0 mg - - - - -   3/21/2024 3.20 3/21/2024 0 mg 0 mg - - - - -   3/14/2024 6.60 3/14/2024 0 mg 0 mg - - - - -   1/30/2024 2.10 1/30/2024 0 mg 0 mg - - - - -   1/4/2024 2.50 1/4/2024 0 mg 0 mg - - - - -   12/21/2023 2.90 12/21/2023 0 mg 0 mg - - - - -   12/11/2023 3.00 12/11/2023 0 mg 0 mg - - - - -   12/7/2023 3.70 12/7/2023 0 mg 0 mg - - - - -     Anticoagulation Monitoring Fri Sat Visit Report   8/22/2024 - - Report   8/9/2024 - - Report   7/31/2024 - - Report   7/15/2024 - - Report   7/8/2024 - - Report   6/3/2024 - - Report   5/20/2024 - - Report   5/1/2024 - - Report   4/24/2024 - - Report   4/18/2024 - - Report   4/11/2024 - - Report   3/28/2024 - - Report   3/21/2024 - - Report   3/14/2024 - - Report   1/30/2024 - - Report   1/4/2024 - - Report   12/21/2023 - - Report   12/11/2023 - - Report   12/7/2023 - - Report      Details                    Problem List Items Addressed This Visit             ICD-10-CM    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.1, N13.8    Relevant Medications    warfarin (Coumadin) 1 mg tablet    Hyperlipidemia E78.5    Relevant  Medications    warfarin (Coumadin) 1 mg tablet    Malignant tumor of prostate (Multi) C61    Relevant Medications    warfarin (Coumadin) 1 mg tablet    Mitral valve disorder I05.9    Relevant Medications    warfarin (Coumadin) 1 mg tablet    Raised prostate specific antigen R97.20    Relevant Medications    warfarin (Coumadin) 1 mg tablet    Spinal stenosis M48.00    Relevant Medications    warfarin (Coumadin) 1 mg tablet    Protein-calorie malnutrition, unspecified severity (Multi) E46    Relevant Medications    warfarin (Coumadin) 1 mg tablet     Other Visit Diagnoses         Codes    Atrial fibrillation, unspecified type (Multi)    -  Primary I48.91    Relevant Medications    warfarin (Coumadin) 2 mg tablet    warfarin (Coumadin) 1 mg tablet    Coagulation defect (Multi)     D68.9    Relevant Medications    warfarin (Coumadin) 2 mg tablet    warfarin (Coumadin) 1 mg tablet    Hypertension, unspecified type     I10    Long term (current) use of anticoagulants     Z79.01    Encounter for annual wellness exam in Medicare patient     Z00.00    Relevant Medications    warfarin (Coumadin) 1 mg tablet    INR (international normal ratio) abnormal     R79.1    Relevant Medications    warfarin (Coumadin) 1 mg tablet    WILMER (obstructive sleep apnea)     G47.33    Relevant Medications    warfarin (Coumadin) 1 mg tablet             Laura L Seaver, APRN-CNP

## 2024-09-25 ASSESSMENT — ENCOUNTER SYMPTOMS
CARDIOVASCULAR NEGATIVE: 1
NEUROLOGICAL NEGATIVE: 1
RESPIRATORY NEGATIVE: 1
PSYCHIATRIC NEGATIVE: 1
GASTROINTESTINAL NEGATIVE: 1
MUSCULOSKELETAL NEGATIVE: 1
CONSTITUTIONAL NEGATIVE: 1
ENDOCRINE NEGATIVE: 1

## 2024-09-25 NOTE — PROGRESS NOTES
Gerry Celeste male  83 y.o. for long term use of anticoagulation visit.    HPI     Gerry Celeste is a 83 y.o. here for an INR follow up exam.   Patient has had no chest pain, pressure, palpitations, shortness of breath, or bleeding.      Inr last 3.2 (2.0, 2.0, 5.8, 2.9, 4.10, 2.4, 3.6 - labile always has been  We did discuss ? Changes to eliquis and he said he will talk to the pharmacists regarding a change if i consult. Hx of afib. He has also seen and utd with dr sumner his cardiologist - just saw him.      Warfarin dosing last 2mg MWF rest days 1mg    Inr 4.3 today  - last 3.2   Last dosing - Warfarin 2mg tues and friday but 1mg rest days     He is interested in eliquis will contact clin pharm    New dosing - take now 1mg all days but 2mg on tu/thurs only  Hold today - greens tonight    He is also losign wt i will have him see dr parker for this. He says he is eating. He denies any abd. Pain, stools are all ok and the same. He is utd with his prostate dr - dr roque for monitoring of his psa/post prostate cancer.              Review of Systems   Constitutional: Negative.    HENT: Negative.     Respiratory: Negative.     Cardiovascular: Negative.    Gastrointestinal: Negative.    Endocrine: Negative.    Genitourinary: Negative.    Musculoskeletal: Negative.    Skin: Negative.    Neurological: Negative.    Psychiatric/Behavioral: Negative.         Physical Exam  Vitals and nursing note reviewed.   Constitutional:       Appearance: Normal appearance.   HENT:      Head: Normocephalic.   Eyes:      Pupils: Pupils are equal, round, and reactive to light.   Cardiovascular:      Rate and Rhythm: Normal rate and regular rhythm.      Heart sounds: Normal heart sounds.   Pulmonary:      Effort: Pulmonary effort is normal.      Breath sounds: Normal breath sounds.   Skin:     General: Skin is warm and dry.   Neurological:      General: No focal deficit present.      Mental Status: He is alert and oriented to person,  place, and time. Mental status is at baseline.   Psychiatric:         Mood and Affect: Mood normal.         Behavior: Behavior normal.         Thought Content: Thought content normal.         Judgment: Judgment normal.          Visit Vitals  /66   Pulse 68      Vitals:    09/26/24 1249   Weight: 59 kg (130 lb)        Anticoagulation Episode Summary       Current INR goal:  --   TTR:  --   Next INR check:  10/3/2024   INR from last check:  4.30 (9/26/2024)   Weekly max warfarin dose:  --   Target end date:  --   INR check location:  --   Preferred lab:  --   Send INR reminders to:  --       Comments:  --                More data may exist     Anticoagulation Monitoring INR INR Date Last Week Total Next Week Total Sun Mon Tue Wed Thu   9/26/2024 4.30 9/26/2024 0 mg 0 mg - - - - -   8/22/2024 2.00 8/22/2024 0 mg 0 mg - - - - -   8/9/2024 2.00 8/9/2024 0 mg 0 mg - - - - -   7/31/2024 5.80 7/31/2024 0 mg 0 mg - - - - -   7/15/2024 2.90 7/15/2024 0 mg 0 mg - - - - -   7/8/2024 4.10 7/8/2024 0 mg 0 mg - - - - -   6/3/2024 2.40 6/3/2024 0 mg 0 mg - - - - -   5/20/2024 3.60 5/20/2024 0 mg 0 mg - - - - -   5/1/2024 1.80 5/1/2024 0 mg 0 mg - - - - -   4/24/2024 1.60 4/24/2024 0 mg 0 mg - - - - -   4/18/2024 4.10 4/18/2024 0 mg 0 mg - - - - -   4/11/2024 4.60 4/11/2024 0 mg 0 mg - - - - -   3/28/2024 1.50 3/28/2024 0 mg 0 mg - - - - -   3/21/2024 3.20 3/21/2024 0 mg 0 mg - - - - -   3/14/2024 6.60 3/14/2024 0 mg 0 mg - - - - -   1/30/2024 2.10 1/30/2024 0 mg 0 mg - - - - -   1/4/2024 2.50 1/4/2024 0 mg 0 mg - - - - -   12/21/2023 2.90 12/21/2023 0 mg 0 mg - - - - -   12/11/2023 3.00 12/11/2023 0 mg 0 mg - - - - -   12/7/2023 3.70 12/7/2023 0 mg 0 mg - - - - -     Anticoagulation Monitoring Fri Sat Visit Report   9/26/2024 - - Report   8/22/2024 - - Report   8/9/2024 - - Report   7/31/2024 - - Report   7/15/2024 - - Report   7/8/2024 - - Report   6/3/2024 - - Report   5/20/2024 - - Report   5/1/2024 - - Report   4/24/2024  - - Report   4/18/2024 - - Report   4/11/2024 - - Report   3/28/2024 - - Report   3/21/2024 - - Report   3/14/2024 - - Report   1/30/2024 - - Report   1/4/2024 - - Report   12/21/2023 - - Report   12/11/2023 - - Report   12/7/2023 - - Report      Details                  Problem List Items Addressed This Visit    None  Visit Diagnoses         Codes    Atrial fibrillation, unspecified type (Multi)    -  Primary I48.91    Relevant Orders    POCT INR manually resulted (Completed)    Referral to Clinical Pharmacy    Hypertension, unspecified type     I10    Relevant Orders    POCT INR manually resulted (Completed)    Referral to Clinical Pharmacy    Long term (current) use of anticoagulants     Z79.01    Relevant Orders    POCT INR manually resulted (Completed)    Referral to Clinical Pharmacy           Laura L Seaver, APRN-CNP

## 2024-09-26 ENCOUNTER — APPOINTMENT (OUTPATIENT)
Dept: PRIMARY CARE | Facility: CLINIC | Age: 84
End: 2024-09-26
Payer: MEDICARE

## 2024-09-26 VITALS
SYSTOLIC BLOOD PRESSURE: 112 MMHG | HEART RATE: 68 BPM | BODY MASS INDEX: 18.13 KG/M2 | DIASTOLIC BLOOD PRESSURE: 66 MMHG | WEIGHT: 130 LBS

## 2024-09-26 DIAGNOSIS — I10 HYPERTENSION, UNSPECIFIED TYPE: ICD-10-CM

## 2024-09-26 DIAGNOSIS — Z79.01 LONG TERM (CURRENT) USE OF ANTICOAGULANTS: ICD-10-CM

## 2024-09-26 DIAGNOSIS — I48.91 ATRIAL FIBRILLATION, UNSPECIFIED TYPE (MULTI): Primary | ICD-10-CM

## 2024-09-26 LAB
POC INR: 4.3
POC PROTHROMBIN TIME: NORMAL

## 2024-09-26 PROCEDURE — 1123F ACP DISCUSS/DSCN MKR DOCD: CPT | Performed by: NURSE PRACTITIONER

## 2024-09-26 PROCEDURE — 85610 PROTHROMBIN TIME: CPT | Performed by: NURSE PRACTITIONER

## 2024-09-26 PROCEDURE — 1159F MED LIST DOCD IN RCRD: CPT | Performed by: NURSE PRACTITIONER

## 2024-09-26 PROCEDURE — 3078F DIAST BP <80 MM HG: CPT | Performed by: NURSE PRACTITIONER

## 2024-09-26 PROCEDURE — 3074F SYST BP LT 130 MM HG: CPT | Performed by: NURSE PRACTITIONER

## 2024-09-26 PROCEDURE — 99213 OFFICE O/P EST LOW 20 MIN: CPT | Performed by: NURSE PRACTITIONER

## 2024-09-26 PROCEDURE — 1036F TOBACCO NON-USER: CPT | Performed by: NURSE PRACTITIONER

## 2024-09-26 NOTE — PATIENT INSTRUCTIONS
New warfarin dosing-   No warfarin today - hold it  Warfarin 2mg on tuesdays and fridays only, rest days 1mg

## 2024-09-27 DIAGNOSIS — I48.91 ATRIAL FIBRILLATION, UNSPECIFIED TYPE (MULTI): Primary | ICD-10-CM

## 2024-10-03 ENCOUNTER — APPOINTMENT (OUTPATIENT)
Dept: PRIMARY CARE | Facility: CLINIC | Age: 84
End: 2024-10-03
Payer: MEDICARE

## 2024-10-10 ENCOUNTER — TELEPHONE (OUTPATIENT)
Dept: PRIMARY CARE | Facility: CLINIC | Age: 84
End: 2024-10-10
Payer: MEDICARE

## 2024-10-10 RX ORDER — TAMSULOSIN HYDROCHLORIDE 0.4 MG/1
0.4 CAPSULE ORAL 2 TIMES DAILY
Qty: 90 CAPSULE | Refills: 1 | OUTPATIENT
Start: 2024-10-10

## 2025-03-12 ENCOUNTER — OFFICE (OUTPATIENT)
Dept: URBAN - METROPOLITAN AREA CLINIC 26 | Facility: CLINIC | Age: 85
End: 2025-03-12
Payer: MEDICARE

## 2025-03-12 VITALS
TEMPERATURE: 98.41 F | HEIGHT: 70 IN | SYSTOLIC BLOOD PRESSURE: 130 MMHG | HEART RATE: 81 BPM | DIASTOLIC BLOOD PRESSURE: 79 MMHG | WEIGHT: 128 LBS

## 2025-03-12 DIAGNOSIS — R79.89 OTHER SPECIFIED ABNORMAL FINDINGS OF BLOOD CHEMISTRY: ICD-10-CM

## 2025-03-12 PROCEDURE — 99204 OFFICE O/P NEW MOD 45 MIN: CPT | Performed by: INTERNAL MEDICINE

## 2025-04-24 DIAGNOSIS — I48.91 ATRIAL FIBRILLATION, UNSPECIFIED TYPE (MULTI): ICD-10-CM

## 2025-04-24 DIAGNOSIS — R60.0 LOCALIZED EDEMA: ICD-10-CM

## 2025-04-24 RX ORDER — FUROSEMIDE 20 MG/1
20 TABLET ORAL DAILY PRN
Qty: 90 TABLET | Refills: 1 | Status: SHIPPED | OUTPATIENT
Start: 2025-04-24